# Patient Record
Sex: MALE | Race: WHITE | NOT HISPANIC OR LATINO | Employment: FULL TIME | URBAN - METROPOLITAN AREA
[De-identification: names, ages, dates, MRNs, and addresses within clinical notes are randomized per-mention and may not be internally consistent; named-entity substitution may affect disease eponyms.]

---

## 2017-04-26 ENCOUNTER — HOSPITAL ENCOUNTER (EMERGENCY)
Facility: HOSPITAL | Age: 32
Discharge: HOME | End: 2017-04-26
Attending: EMERGENCY MEDICINE
Payer: COMMERCIAL

## 2017-04-26 VITALS
RESPIRATION RATE: 18 BRPM | HEIGHT: 72 IN | HEART RATE: 63 BPM | OXYGEN SATURATION: 95 % | SYSTOLIC BLOOD PRESSURE: 126 MMHG | BODY MASS INDEX: 32.51 KG/M2 | TEMPERATURE: 98 F | DIASTOLIC BLOOD PRESSURE: 83 MMHG | WEIGHT: 240 LBS

## 2017-04-26 DIAGNOSIS — R10.32 LEFT LOWER QUADRANT PAIN: Primary | ICD-10-CM

## 2017-04-26 DIAGNOSIS — M54.50 BILATERAL LOW BACK PAIN WITHOUT SCIATICA, UNSPECIFIED CHRONICITY: ICD-10-CM

## 2017-04-26 LAB
ALBUMIN SERPL BCP-MCNC: 4.4 G/DL
ALP SERPL-CCNC: 56 U/L
ALT SERPL W/O P-5'-P-CCNC: 22 U/L
ANION GAP SERPL CALC-SCNC: 10 MMOL/L
ANISOCYTOSIS BLD QL SMEAR: SLIGHT
AST SERPL-CCNC: 22 U/L
BASOPHILS # BLD AUTO: 0.02 K/UL
BASOPHILS NFR BLD: 0.2 %
BILIRUB SERPL-MCNC: 0.3 MG/DL
BILIRUB UR QL STRIP: NEGATIVE
BUN SERPL-MCNC: 13 MG/DL
CALCIUM SERPL-MCNC: 9.9 MG/DL
CHLORIDE SERPL-SCNC: 102 MMOL/L
CK SERPL-CCNC: 296 U/L
CLARITY UR: CLEAR
CO2 SERPL-SCNC: 28 MMOL/L
COLOR UR: YELLOW
CREAT SERPL-MCNC: 1 MG/DL
DACRYOCYTES BLD QL SMEAR: NORMAL
DIFFERENTIAL METHOD: NORMAL
EOSINOPHIL # BLD AUTO: 0.2 K/UL
EOSINOPHIL NFR BLD: 1.8 %
ERYTHROCYTE [DISTWIDTH] IN BLOOD BY AUTOMATED COUNT: 12.6 %
EST. GFR  (AFRICAN AMERICAN): >60 ML/MIN/1.73 M^2
EST. GFR  (NON AFRICAN AMERICAN): >60 ML/MIN/1.73 M^2
GLUCOSE SERPL-MCNC: 87 MG/DL
GLUCOSE UR QL STRIP: NEGATIVE
HCT VFR BLD AUTO: 42.7 %
HGB BLD-MCNC: 14.8 G/DL
HGB UR QL STRIP: NEGATIVE
KETONES UR QL STRIP: NEGATIVE
LEUKOCYTE ESTERASE UR QL STRIP: NEGATIVE
LYMPHOCYTES # BLD AUTO: 3.3 K/UL
LYMPHOCYTES NFR BLD: 29.4 %
MCH RBC QN AUTO: 30.2 PG
MCHC RBC AUTO-ENTMCNC: 34.7 %
MCV RBC AUTO: 87 FL
MONOCYTES # BLD AUTO: 1 K/UL
MONOCYTES NFR BLD: 9 %
NEUTROPHILS # BLD AUTO: 6.6 K/UL
NEUTROPHILS NFR BLD: 59.6 %
NITRITE UR QL STRIP: NEGATIVE
OVALOCYTES BLD QL SMEAR: NORMAL
PH UR STRIP: 6 [PH] (ref 5–8)
PLATELET # BLD AUTO: 297 K/UL
PMV BLD AUTO: 9.8 FL
POIKILOCYTOSIS BLD QL SMEAR: SLIGHT
POLYCHROMASIA BLD QL SMEAR: NORMAL
POTASSIUM SERPL-SCNC: 4.1 MMOL/L
PROT SERPL-MCNC: 7.9 G/DL
PROT UR QL STRIP: NEGATIVE
RBC # BLD AUTO: 4.9 M/UL
SODIUM SERPL-SCNC: 140 MMOL/L
SP GR UR STRIP: 1.01 (ref 1–1.03)
URN SPEC COLLECT METH UR: NORMAL
UROBILINOGEN UR STRIP-ACNC: NEGATIVE EU/DL
WBC # BLD AUTO: 11.17 K/UL

## 2017-04-26 PROCEDURE — 25000003 PHARM REV CODE 250: Performed by: NURSE PRACTITIONER

## 2017-04-26 PROCEDURE — 82550 ASSAY OF CK (CPK): CPT

## 2017-04-26 PROCEDURE — 63600175 PHARM REV CODE 636 W HCPCS: Performed by: NURSE PRACTITIONER

## 2017-04-26 PROCEDURE — 96361 HYDRATE IV INFUSION ADD-ON: CPT

## 2017-04-26 PROCEDURE — 85025 COMPLETE CBC W/AUTO DIFF WBC: CPT

## 2017-04-26 PROCEDURE — 80053 COMPREHEN METABOLIC PANEL: CPT

## 2017-04-26 PROCEDURE — 96374 THER/PROPH/DIAG INJ IV PUSH: CPT

## 2017-04-26 PROCEDURE — 81003 URINALYSIS AUTO W/O SCOPE: CPT

## 2017-04-26 PROCEDURE — 99284 EMERGENCY DEPT VISIT MOD MDM: CPT | Mod: 25

## 2017-04-26 RX ORDER — DICYCLOMINE HYDROCHLORIDE 10 MG/1
20 CAPSULE ORAL
Status: COMPLETED | OUTPATIENT
Start: 2017-04-26 | End: 2017-04-26

## 2017-04-26 RX ORDER — NAPROXEN 500 MG/1
500 TABLET ORAL 2 TIMES DAILY PRN
Qty: 10 TABLET | Refills: 0 | Status: SHIPPED | OUTPATIENT
Start: 2017-04-26 | End: 2017-05-01

## 2017-04-26 RX ORDER — KETOROLAC TROMETHAMINE 30 MG/ML
10 INJECTION, SOLUTION INTRAMUSCULAR; INTRAVENOUS
Status: COMPLETED | OUTPATIENT
Start: 2017-04-26 | End: 2017-04-26

## 2017-04-26 RX ORDER — DICYCLOMINE HYDROCHLORIDE 20 MG/1
20 TABLET ORAL 2 TIMES DAILY PRN
Qty: 10 TABLET | Refills: 0 | Status: SHIPPED | OUTPATIENT
Start: 2017-04-26 | End: 2017-05-26

## 2017-04-26 RX ADMIN — SODIUM CHLORIDE 1000 ML: 0.9 INJECTION, SOLUTION INTRAVENOUS at 08:04

## 2017-04-26 RX ADMIN — KETOROLAC TROMETHAMINE 10 MG: 30 INJECTION, SOLUTION INTRAMUSCULAR at 08:04

## 2017-04-26 RX ADMIN — DICYCLOMINE HYDROCHLORIDE 20 MG: 10 CAPSULE ORAL at 08:04

## 2017-04-26 NOTE — ED AVS SNAPSHOT
[facility]  [place] [address]               Heri Gordillo   2017  7:26 PM   ED    Description:  Male : 1985   Department:  [facility]           Your Care was Coordinated By:     Provider Role From To    Sneha Aguilar MD Attending Provider 17 --    Carlos Silva, SHERYL Nurse Practitioner 17 --      Reason for Visit     Back Pain     Abdominal Pain           Diagnoses this Visit        Comments    Left lower quadrant pain    -  Primary     Bilateral low back pain without sciatica, unspecified chronicity           ED Disposition     ED Disposition Condition Comment    Discharge             To Do List           Follow-up Information     Schedule an appointment as soon as possible for a visit with Valentin Chavez MD.    Specialty:  Family Medicine    Why:  This Week, For Follow-Up    Contact information:    [address] [place] [address]  [phone]          Go to [facility].    Specialty:  Emergency Medicine    Why:  If symptoms worsen    Contact information:    [address] [place] [id number] [phone]       These Medications        Disp Refills Start End    naproxen (NAPROSYN) 500 MG tablet 10 tablet 0 2017    Take 1 tablet (500 mg total) by mouth 2 (two) times daily as needed (Pain). Take With Meals - Oral    [place] [facility] [place] [address] Ph #: [phone]       dicyclomine (BENTYL) 20 mg tablet 10 tablet 0 2017    Take 1 tablet (20 mg total) by mouth 2 (two) times daily as needed (Abdominal Pain). - Oral    [place] [facility] [place] [address] Ph #: [phone]         [facility]     [facility] Nurse Care [facility] -  Assistance  Unless otherwise directed by your provider, please contact [facility], our nurse care line  that is available for 24/7 assistance.     Registered nurses in the [facility] provide: appointment scheduling, clinical advisement, health education, and other advisory services.  Call: [phone] (toll free)               Medications           Message regarding Medications     Verify the changes and/or additions to your medication regime listed below are the same as discussed with your clinician today.  If any of these changes or additions are incorrect, please notify your healthcare provider.        START taking these NEW medications        Refills    naproxen (NAPROSYN) 500 MG tablet 0    Sig: Take 1 tablet (500 mg total) by mouth 2 (two) times daily as needed (Pain). Take With Meals    Class: Print    Route: Oral    dicyclomine (BENTYL) 20 mg tablet 0    Sig: Take 1 tablet (20 mg total) by mouth 2 (two) times daily as needed (Abdominal Pain).    Class: Print    Route: Oral      These medications were administered today        Dose Freq    sodium chloride 0.9% bolus 1,000 mL 1,000 mL ED 1 Time    Sig: Inject 1,000 mLs into the vein ED 1 Time.    Class: Normal    Route: Intravenous    dicyclomine capsule 20 mg 20 mg ED 1 Time    Sig: Take 2 capsules (20 mg total) by mouth ED 1 Time.    Class: Normal    Route: Oral    ketorolac injection 10 mg 10 mg ED 1 Time    Sig: Inject 10 mg into the vein ED 1 Time.    Class: Normal    Route: Intravenous           Verify that the below list of medications is an accurate representation of the medications you are currently taking.  If none reported, the list may be blank. If incorrect, please contact your healthcare provider. Carry this list with you in case of emergency.           Current Medications     ESOMEPRAZOLE MAGNESIUM ORAL Take by mouth.    dicyclomine (BENTYL) 20 mg tablet Take 1 tablet (20 mg total) by mouth 2 (two) times daily as needed (Abdominal Pain).    naproxen (NAPROSYN) 500 MG tablet Take 1 tablet (500 mg total) by mouth 2 (two) times daily  as needed (Pain). Take With Meals           Clinical Reference Information           Your Vitals Were     BP Pulse Temp Resp Height Weight    126/83 63 97.5 °F (36.4 °C) 18 6' (1.829 m) 108.9 kg (240 lb)    SpO2 BMI             95% 32.55 kg/m2         Allergies as of 4/26/2017        Reactions    Mucinex [Guaifenesin] Hives    Tylenol [Acetaminophen] Hives      Immunizations Administered on Date of Encounter - 4/26/2017     None      ED Micro, Lab, POCT     Start Ordered       Status Ordering Provider    04/26/17 2003 04/26/17 2003  Urinalysis Clean Catch  STAT      Final result     04/26/17 2002 04/26/17 2003  CBC auto differential  STAT      Final result     04/26/17 2002 04/26/17 2003  Comprehensive metabolic panel  STAT      Final result     04/26/17 2002 04/26/17 2003  CPK  STAT      Final result       ED Imaging Orders     Start Ordered       Status Ordering Provider    04/26/17 2004 04/26/17 2003  X-Ray Abdomen Flat And Erect  1 time imaging      Final result         Discharge Instructions       Please return to the Emergency Department for any new or worsening symptoms including: worsening back pain or abdominal pain, changes in the location of your back or abdominal pain, fever, chest pain, shortness of breath, loss of consciousness, dizziness, weakness, or any other concerns.     Please follow up with your Primary Care Provider within in the week. If you do not have a Primary Care Provider, you may contact the one listed on your discharge paperwork or you may also call the [facility]oint[facility]k at [phone] to schedule an appointment with a Primary Care Provider.     Please take all medication as prescribed.     You have been prescribed Naproxen for pain. This is an Non-Steroidal Anti-Inflammatory (NSAID) Medication. Please do not take any additional NSAIDs while you are taking this medication including (Advil, Aleve, Motrin, Ibuprofen, Mobic\meloxicam, Naprosyn, etc.). Please stop taking  this medication if you experience: weakness, itching, yellow skin or eyes, joint pains, vomiting blood, blood or black stools, unusual weight gain, or swelling in your arms, legs, hands, or feet.     Emergency Department Survey:  Our goal in the emergency department is to always give you outstanding care and exceptional service. You may receive a survey by mail or e-mail in the next week regarding your experience in our ED. We would greatly appreciate your completing and returning the survey. Your feedback provides us with a way to recognize our staff who give very good care and it helps us learn how to improve when your experience was below our aspiration of excellence.       Discharge References/Attachments     ABDOMINAL PAIN, UNKNOWN CAUSE, (MALE) (ENGLISH)    BACK PAIN (ACUTE OR CHRONIC) (ENGLISH)      [facility] Sign-Up     Activating your [facility] account is as easy as 1-2-3!     1) Visit [email or web address], select Sign Up Now, enter this activation code and your date of birth, then select Next.  [email or web address]  Expires: 6/10/2017 10:03 PM      2) Create a username and password to use when you visit [facility] in the future and select a security question in case you lose your password and select Next.    3) Enter your e-mail address and click Sign Up!    Additional Information  If you have questions, please e-mail [email or web address] or call [phone] to talk to our [facility] staff. Remember, [facility] is NOT to be used for urgent needs. For medical emergencies, dial 911.          [facility] complies with applicable Federal civil rights laws and does not discriminate on the basis of race, color, national origin, age, disability, or sex.        Language Assistance Services     ATTENTION: Language assistance services are available, free of charge. Please call [phone].      ATENCIÓN: Si habla español, tiene a cruz disposición servicios gratuitos de asistencia lingüística. Llame al  [phone].     LIUDMILA Ý: N?u b?n nói Ti?ng Vi?t, có các d?ch v? h? tr? ngôn ng? mi?n phí dành cho b?n. G?i s? [phone].

## 2017-04-27 NOTE — ED PROVIDER NOTES
"Encounter Date: 4/26/2017    SCRIBE #1 NOTE: I, Brady Pena , am scribing for, and in the presence of,  SHERYL Almanza . I have scribed the following portions of the note - Other sections scribed: HPI, ROS .       History     Chief Complaint   Patient presents with    Back Pain     lower back pain x 2weeks and abdmonial pain x 2weeks. denies n/v    Abdominal Pain     Review of patient's allergies indicates:   Allergen Reactions    Mucinex [guaifenesin] Hives    Tylenol [acetaminophen] Hives     HPI Comments: CC: Abdominal Pain; Back Pain     HPI: This 31 y.o. male with a medical history of Neck injury and Tailbone injury presents to the ED accompanied by spouse c/o bilateral lower back pain and "sharp" lower left quadrant abdominal pain that began 2 weeks ago. Standing up exacerbates the back pain. Food/drinking exacerbates the abdominal pain. Pt also reports "slow" difficult urination that began earlier today. Symptoms are acute in onset, constant, and severe 10/10. Pt saw a gastroenterologist yesterday and was given no treatment plan. Pt attempted treatment with Prilosec daily for the last 5 days, with some relief. Pt is a  and denies any unusual heavy lifting. Pt denies history of back pain. Pt denies any medical complications. Pt denies blood in stool, dysuria, hematuria, or any other associated symptoms. Pt has no alleviating factors.     The history is provided by the patient. No  was used.     Past Medical History:   Diagnosis Date    Neck injury     Tailbone injury      Past Surgical History:   Procedure Laterality Date    SPINE SURGERY       History reviewed. No pertinent family history.  Social History   Substance Use Topics    Smoking status: Never Smoker    Smokeless tobacco: None    Alcohol use Yes     Review of Systems   Constitutional: Negative for fever.   HENT: Negative for ear pain and sore throat.    Respiratory: Negative for shortness of " breath.    Cardiovascular: Negative for chest pain.   Gastrointestinal: Positive for abdominal pain (LLQ). Negative for blood in stool, nausea and vomiting.   Genitourinary: Positive for difficulty urinating (intermittent). Negative for dysuria, flank pain, frequency, hematuria, penile pain, penile swelling, scrotal swelling and testicular pain.   Musculoskeletal: Positive for back pain (bilateral lower region). Negative for neck pain.   Skin: Negative for rash and wound.   Neurological: Negative for syncope and weakness.   Hematological: Does not bruise/bleed easily.   Psychiatric/Behavioral: Negative for confusion.       Physical Exam   Initial Vitals   BP Pulse Resp Temp SpO2   04/26/17 1918 04/26/17 1918 04/26/17 1918 04/26/17 1918 04/26/17 1918   137/85 83 19 97.7 °F (36.5 °C) 96 %     Physical Exam    Nursing note and vitals reviewed.  Constitutional: Vital signs are normal. He appears well-developed and well-nourished. He is not diaphoretic. He is cooperative.  Non-toxic appearance. He does not have a sickly appearance. He does not appear ill. No distress.   HENT:   Head: Normocephalic and atraumatic.   Right Ear: Tympanic membrane and external ear normal.   Left Ear: Tympanic membrane and external ear normal.   Nose: No rhinorrhea.   Mouth/Throat: Uvula is midline and oropharynx is clear and moist.   Eyes: Conjunctivae and EOM are normal.   Neck: Normal range of motion and full passive range of motion without pain. No spinous process tenderness and no muscular tenderness present. Normal range of motion present. No rigidity.   Cardiovascular: Normal rate, regular rhythm, normal heart sounds and intact distal pulses. Exam reveals no gallop and no friction rub.    No murmur heard.  Pulses:       Radial pulses are 2+ on the right side, and 2+ on the left side.        Posterior tibial pulses are 2+ on the right side, and 2+ on the left side.   Pulmonary/Chest: Breath sounds normal. No tachypnea and no bradypnea.  No respiratory distress. He has no wheezes. He has no rhonchi. He has no rales. He exhibits no tenderness.   Abdominal: Soft. Normal appearance and bowel sounds are normal. He exhibits no distension, no abdominal bruit and no mass. There is tenderness (mild) in the left lower quadrant. There is no rigidity, no rebound, no guarding, no CVA tenderness, no tenderness at McBurney's point and negative Watson's sign. No hernia. Hernia confirmed negative in the ventral area, confirmed negative in the right inguinal area and confirmed negative in the left inguinal area.   Genitourinary: Testes normal. Right testis shows no mass, no swelling and no tenderness. Left testis shows no mass, no swelling and no tenderness. No penile erythema or penile tenderness. No discharge found.   Musculoskeletal: Normal range of motion.        Cervical back: Normal.        Thoracic back: Normal.        Lumbar back: He exhibits tenderness and pain. He exhibits no bony tenderness.        Back:    Palpation of bilateral lower lumbar muscular back.  There is no midline cervical, thoracic, lumbar tenderness palpation.  There is no bruising, erythema, edema, or increased warmth noted.    Neurological: He is alert and oriented to person, place, and time. He has normal strength. No sensory deficit. Coordination and gait normal. GCS eye subscore is 4. GCS verbal subscore is 5. GCS motor subscore is 6.   Skin: Skin is warm and dry. No rash noted.   Psychiatric: He has a normal mood and affect. His behavior is normal. Judgment and thought content normal.         ED Course   Procedures  Labs Reviewed   CK - Abnormal; Notable for the following:        Result Value     (*)     All other components within normal limits   CBC W/ AUTO DIFFERENTIAL   COMPREHENSIVE METABOLIC PANEL   URINALYSIS             Medical Decision Making:   Clinical Tests:   Lab Tests: Ordered and Reviewed  Radiological Study: Ordered and Reviewed       APC / Resident Notes:    This is an evaluation of a 31 y.o. male that presents to the Emergency Department for a 2 week history of constant bilateral lower lumbar back pain and a 2 week history of constant left lower abdominal pain.  He denies any injuries.  He does work construction.  He saw GI yesterday because he thought the pain could be related to his stomach.  GI the rectal exam and noted a slightly enlarged prostate otherwise unremarkable exam. Physical Exam shows a non-toxic, afebrile, and well appearing male.  Generalized muscular tenderness to lower lumbar back.  There is no midline lumbar tenderness.  Bony abnormalities palpated on the cervical, thoracic, lumbar spines.  Ears and throat without infection.  Breath sounds clear and equal auscultation.  Heart regular rhythm with normal rate.  His abdomen is soft with mild tenderness in left lower quadrant with rebound, guarding, or masses.  Bowel sounds regular.   exam with no hernia or testicular pain.  Equal sensation to upper and lower extremities bilaterally.  No saddle anesthesia.  Vital Signs Are Reassuring. RESULTS: Urine negative for infection.  Unremarkable CMP and CMP.  CPK mildly elevated at 296.  X-ray flat and erect of the abdomen with no bowel distention, gas, or mass effect noted.  Osseous structures intact.    My overall impression is back pain, likely musculoskeletal and left lower quadrant abdominal pain. I considered, but at this time, do not suspect pyelonephritis, UTI, bowel obstruction, bowel perforation, appendicitis, rhabdomyolysis, vertebral fracture, spinal epidural abscess, cauda equina, shingles, diverticulitis, hernia, kidney stone.     ED Course: Toradol and Bentyl.  Reassessment: Patient reports his abdominal pain has improved.  Denies any nausea or vomiting at this time.  D/C Meds: Bentyl and naproxen. The diagnosis, treatment plan, instructions for follow-up and reevaluation with his PCP as well as ED return precautions were discussed and  understanding was verbalized. All questions or concerns have been addressed. This case was discussed with Dr. Aguilar who is in agreement with my assessment and plan. SONJA Franco, FELA        Scribe Attestation:   Scribe #1: I performed the above scribed service and the documentation accurately describes the services I performed. I attest to the accuracy of the note.    Attending Attestation:           Physician Attestation for Scribe:  Physician Attestation Statement for Scribe #1: I, SHERYL Almanza , reviewed documentation, as scribed by Brady Pena  in my presence, and it is both accurate and complete.                 ED Course     Clinical Impression:   The primary encounter diagnosis was Left lower quadrant pain. A diagnosis of Bilateral low back pain without sciatica, unspecified chronicity was also pertinent to this visit.    Disposition:   Disposition: Discharged  Condition: Stable       SHERYL Almanza  04/26/17 2150

## 2017-04-27 NOTE — DISCHARGE INSTRUCTIONS
Please return to the Emergency Department for any new or worsening symptoms including: worsening back pain or abdominal pain, changes in the location of your back or abdominal pain, fever, chest pain, shortness of breath, loss of consciousness, dizziness, weakness, or any other concerns.     Please follow up with your Primary Care Provider within in the week. If you do not have a Primary Care Provider, you may contact the one listed on your discharge paperwork or you may also call the [facility] Appointment [facility]k at [phone] to schedule an appointment with a Primary Care Provider.     Please take all medication as prescribed.     You have been prescribed Naproxen for pain. This is an Non-Steroidal Anti-Inflammatory (NSAID) Medication. Please do not take any additional NSAIDs while you are taking this medication including (Advil, Aleve, Motrin, Ibuprofen, Mobic\meloxicam, Naprosyn, etc.). Please stop taking this medication if you experience: weakness, itching, yellow skin or eyes, joint pains, vomiting blood, blood or black stools, unusual weight gain, or swelling in your arms, legs, hands, or feet.     Emergency Department Survey:  Our goal in the emergency department is to always give you outstanding care and exceptional service. You may receive a survey by mail or e-mail in the next week regarding your experience in our ED. We would greatly appreciate your completing and returning the survey. Your feedback provides us with a way to recognize our staff who give very good care and it helps us learn how to improve when your experience was below our aspiration of excellence.

## 2017-04-27 NOTE — ED TRIAGE NOTES
Patient states he had lower back pain and left lower abdominal pain for 2 weeks. Patient went to see his GI yesterday and was told he had a swollen prostate.

## 2018-04-07 ENCOUNTER — HOSPITAL ENCOUNTER (EMERGENCY)
Facility: HOSPITAL | Age: 33
Discharge: ANOTHER HOSPITAL | End: 2018-04-07
Attending: EMERGENCY MEDICINE | Admitting: OTOLARYNGOLOGY
Payer: COMMERCIAL

## 2018-04-07 VITALS
OXYGEN SATURATION: 95 % | BODY MASS INDEX: 34.54 KG/M2 | HEIGHT: 72 IN | TEMPERATURE: 98 F | HEART RATE: 89 BPM | DIASTOLIC BLOOD PRESSURE: 79 MMHG | WEIGHT: 255 LBS | RESPIRATION RATE: 20 BRPM | SYSTOLIC BLOOD PRESSURE: 132 MMHG

## 2018-04-07 DIAGNOSIS — S02.92XA CLOSED EXTENSIVE FACIAL FRACTURES, INITIAL ENCOUNTER: ICD-10-CM

## 2018-04-07 DIAGNOSIS — H20.9 TRAUMATIC IRITIS: Primary | ICD-10-CM

## 2018-04-07 PROCEDURE — 12013 RPR F/E/E/N/L/M 2.6-5.0 CM: CPT

## 2018-04-07 PROCEDURE — 63600175 PHARM REV CODE 636 W HCPCS: Performed by: EMERGENCY MEDICINE

## 2018-04-07 PROCEDURE — 96374 THER/PROPH/DIAG INJ IV PUSH: CPT

## 2018-04-07 PROCEDURE — 99285 EMERGENCY DEPT VISIT HI MDM: CPT | Mod: 25

## 2018-04-07 PROCEDURE — 96376 TX/PRO/DX INJ SAME DRUG ADON: CPT

## 2018-04-07 PROCEDURE — 96375 TX/PRO/DX INJ NEW DRUG ADDON: CPT

## 2018-04-07 PROCEDURE — 99285 EMERGENCY DEPT VISIT HI MDM: CPT | Mod: ,,, | Performed by: EMERGENCY MEDICINE

## 2018-04-07 RX ORDER — MORPHINE SULFATE 4 MG/ML
4 INJECTION, SOLUTION INTRAMUSCULAR; INTRAVENOUS
Status: COMPLETED | OUTPATIENT
Start: 2018-04-07 | End: 2018-04-07

## 2018-04-07 RX ORDER — ATROPINE SULFATE 10 MG/ML
2 SOLUTION/ DROPS OPHTHALMIC 2 TIMES DAILY
Qty: 5 BOTTLE | Refills: 0 | Status: SHIPPED | OUTPATIENT
Start: 2018-04-07

## 2018-04-07 RX ORDER — ONDANSETRON 2 MG/ML
4 INJECTION INTRAMUSCULAR; INTRAVENOUS
Status: COMPLETED | OUTPATIENT
Start: 2018-04-07 | End: 2018-04-07

## 2018-04-07 RX ORDER — HYDROCODONE BITARTRATE AND ACETAMINOPHEN 5; 325 MG/1; MG/1
1 TABLET ORAL EVERY 6 HOURS PRN
Qty: 20 TABLET | Refills: 0 | Status: SHIPPED | OUTPATIENT
Start: 2018-04-07

## 2018-04-07 RX ORDER — PREDNISOLONE ACETATE 10 MG/ML
1 SUSPENSION/ DROPS OPHTHALMIC 4 TIMES DAILY
Qty: 5 ML | Refills: 0 | Status: SHIPPED | OUTPATIENT
Start: 2018-04-07 | End: 2018-04-17

## 2018-04-07 RX ORDER — PREDNISOLONE ACETATE 10 MG/ML
1 SUSPENSION/ DROPS OPHTHALMIC 4 TIMES DAILY
Qty: 5 ML | Refills: 0 | Status: SHIPPED | OUTPATIENT
Start: 2018-04-07 | End: 2018-04-07

## 2018-04-07 RX ORDER — AMOXICILLIN AND CLAVULANATE POTASSIUM 875; 125 MG/1; MG/1
1 TABLET, FILM COATED ORAL 2 TIMES DAILY
Qty: 14 TABLET | Refills: 0 | Status: ON HOLD | OUTPATIENT
Start: 2018-04-07 | End: 2018-04-21

## 2018-04-07 RX ADMIN — ONDANSETRON 4 MG: 2 INJECTION INTRAMUSCULAR; INTRAVENOUS at 11:04

## 2018-04-07 RX ADMIN — MORPHINE SULFATE 4 MG: 4 INJECTION INTRAVENOUS at 11:04

## 2018-04-07 RX ADMIN — ONDANSETRON 4 MG: 2 INJECTION INTRAMUSCULAR; INTRAVENOUS at 06:04

## 2018-04-07 RX ADMIN — MORPHINE SULFATE 4 MG: 4 INJECTION INTRAVENOUS at 06:04

## 2018-04-07 NOTE — ED NOTES
Appearance:  Pt awake, alert & oriented to person, place & time.    Skin:  Skin warm, dry & intact.  Mucous membranes moist.  Skin turgor normal.  Respiratory:  Respirations even, non-labored.    Neurologic:  Pt moving all extremities without difficulty.  Sensation intact.     Peripheral Vascular:  All peripheral pulses present.  ENT:  Bilateral eye ecchymosis and swelling to upper & lower eyelids.  Subconjunctival hemorrhage noted to both eyes.  Slight oozing of blood from nose.
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
## 2018-04-07 NOTE — DISCHARGE INSTRUCTIONS
Head of bed elevation  Follow up with Dr. Rangel on Tues  Follow up with sujatha  Take antibiotics as prescribed  Strict liquid diet
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
## 2018-04-07 NOTE — ED NOTES
Received report from Adri Rn; pt assessed; vitals assessed; pt in NAD at this time; call light in reach; side rails raised times 2

## 2018-04-09 ENCOUNTER — TELEPHONE (OUTPATIENT)
Dept: OTOLARYNGOLOGY | Facility: CLINIC | Age: 33
End: 2018-04-09

## 2018-04-09 NOTE — TELEPHONE ENCOUNTER
----- Message from Leandra Aguilar sent at 4/9/2018  9:49 AM CDT -----  Contact: pts wife at [phone]  Rangel pt- Juan Carlos saw this pt in the ER Saturday April 6 and was told to come in Tuesday.  Needs to be scheduled for nasal surgery and something with his pallet. Please call Mrs. Gordillo at above number.  Will need asap.

## 2018-04-11 ENCOUNTER — OFFICE VISIT (OUTPATIENT)
Dept: OTOLARYNGOLOGY | Facility: CLINIC | Age: 33
End: 2018-04-11
Payer: COMMERCIAL

## 2018-04-11 VITALS
HEART RATE: 96 BPM | BODY MASS INDEX: 35.15 KG/M2 | DIASTOLIC BLOOD PRESSURE: 82 MMHG | HEIGHT: 72 IN | TEMPERATURE: 98 F | WEIGHT: 259.5 LBS | SYSTOLIC BLOOD PRESSURE: 129 MMHG

## 2018-04-11 DIAGNOSIS — M26.4 MALOCCLUSION: ICD-10-CM

## 2018-04-11 DIAGNOSIS — S02.413B: Primary | ICD-10-CM

## 2018-04-11 DIAGNOSIS — S02.411A CLOSED LE FORT I FRACTURE, INITIAL ENCOUNTER: ICD-10-CM

## 2018-04-11 DIAGNOSIS — S02.412B: ICD-10-CM

## 2018-04-11 DIAGNOSIS — S02.30XB: ICD-10-CM

## 2018-04-11 PROCEDURE — 99999 PR PBB SHADOW E&M-EST. PATIENT-LVL III: CPT | Mod: PBBFAC,,, | Performed by: OTOLARYNGOLOGY

## 2018-04-11 PROCEDURE — 99213 OFFICE O/P EST LOW 20 MIN: CPT | Mod: PBBFAC | Performed by: OTOLARYNGOLOGY

## 2018-04-11 PROCEDURE — 99204 OFFICE O/P NEW MOD 45 MIN: CPT | Mod: S$GLB,,, | Performed by: OTOLARYNGOLOGY

## 2018-04-11 RX ORDER — IBUPROFEN 600 MG/1
600 TABLET ORAL 3 TIMES DAILY
COMMUNITY

## 2018-04-11 NOTE — LETTER
April 11, 2018      Jose Alejandro Britton MD  [address]  [facility] [address]           [facility]  [address]  [facility] [address]  Phone: [phone]  Fax: [phone]          Patient: Heri Gordillo   MR Number: [id number]   YOB: 1985   Date of Visit: 4/11/2018       Dear Dr. Jose Alejandro Britton:    Thank you for referring Heri Gordillo to me for evaluation. Attached you will find relevant portions of my assessment and plan of care.    If you have questions, please do not hesitate to call me. I look forward to following Heri Gordillo along with you.    Sincerely,    Pritesh Rangel MD    Enclosure  CC:  No Recipients    If you would like to receive this communication electronically, please contact [email or web address] or [phone] to request more information on [employer] Link access.    For providers and/or their staff who would like to refer a patient to [facility], please contact us through our one-stop-shop provider referral line, [facility], at [phone].    If you feel you have received this communication in error or would no longer like to receive these types of communications, please e-mail [email or web address]
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
## 2018-04-16 ENCOUNTER — TELEPHONE (OUTPATIENT)
Dept: OPHTHALMOLOGY | Facility: CLINIC | Age: 33
End: 2018-04-16

## 2018-04-18 ENCOUNTER — TELEPHONE (OUTPATIENT)
Dept: OTOLARYNGOLOGY | Facility: CLINIC | Age: 33
End: 2018-04-18

## 2018-04-19 ENCOUNTER — SURGERY (OUTPATIENT)
Age: 33
End: 2018-04-19

## 2018-04-19 ENCOUNTER — HOSPITAL ENCOUNTER (OUTPATIENT)
Facility: HOSPITAL | Age: 33
LOS: 1 days | Discharge: HOME | End: 2018-04-21
Attending: OTOLARYNGOLOGY | Admitting: OTOLARYNGOLOGY
Payer: COMMERCIAL

## 2018-04-19 ENCOUNTER — ANESTHESIA EVENT (OUTPATIENT)
Dept: SURGERY | Facility: HOSPITAL | Age: 33
End: 2018-04-19
Payer: COMMERCIAL

## 2018-04-19 ENCOUNTER — ANESTHESIA (OUTPATIENT)
Dept: SURGERY | Facility: HOSPITAL | Age: 33
End: 2018-04-19
Payer: COMMERCIAL

## 2018-04-19 DIAGNOSIS — S02.92XA: Primary | ICD-10-CM

## 2018-04-19 PROCEDURE — 27100025 HC TUBING, SET FLUID WARMER: Performed by: NURSE ANESTHETIST, CERTIFIED REGISTERED

## 2018-04-19 PROCEDURE — 27200708 HC INTUBATION/EXCHANGE WAND: Performed by: NURSE ANESTHETIST, CERTIFIED REGISTERED

## 2018-04-19 PROCEDURE — 27201423 OPTIME MED/SURG SUP & DEVICES STERILE SUPPLY: Performed by: OTOLARYNGOLOGY

## 2018-04-19 PROCEDURE — 25000003 PHARM REV CODE 250: Performed by: OTOLARYNGOLOGY

## 2018-04-19 PROCEDURE — D9220A PRA ANESTHESIA: Mod: ANES,,, | Performed by: ANESTHESIOLOGY

## 2018-04-19 PROCEDURE — 63600175 PHARM REV CODE 636 W HCPCS: Performed by: ANESTHESIOLOGY

## 2018-04-19 PROCEDURE — 25000003 PHARM REV CODE 250: Performed by: NURSE ANESTHETIST, CERTIFIED REGISTERED

## 2018-04-19 PROCEDURE — 36000711: Performed by: OTOLARYNGOLOGY

## 2018-04-19 PROCEDURE — 63600175 PHARM REV CODE 636 W HCPCS: Performed by: NURSE ANESTHETIST, CERTIFIED REGISTERED

## 2018-04-19 PROCEDURE — C1713 ANCHOR/SCREW BN/BN,TIS/BN: HCPCS | Performed by: OTOLARYNGOLOGY

## 2018-04-19 PROCEDURE — 21340 PERQ TX NASOETHMOID FX: CPT | Mod: 51,,, | Performed by: OTOLARYNGOLOGY

## 2018-04-19 PROCEDURE — 71000039 HC RECOVERY, EACH ADD'L HOUR: Performed by: OTOLARYNGOLOGY

## 2018-04-19 PROCEDURE — 21435 OPTX CRNFC SEP COMP INT&/XTR: CPT | Mod: ,,, | Performed by: OTOLARYNGOLOGY

## 2018-04-19 PROCEDURE — 21390 OPN TX ORBIT PERIORBTL IMPLT: CPT | Mod: 50,51,, | Performed by: OTOLARYNGOLOGY

## 2018-04-19 PROCEDURE — D9220A PRA ANESTHESIA: Mod: CRNA,,, | Performed by: NURSE ANESTHETIST, CERTIFIED REGISTERED

## 2018-04-19 PROCEDURE — 21423 OPTX PALATAL/MAX FX COMP: CPT | Mod: 51,,, | Performed by: OTOLARYNGOLOGY

## 2018-04-19 PROCEDURE — 11012 DEB SKIN BONE AT FX SITE: CPT | Mod: 51,,, | Performed by: OTOLARYNGOLOGY

## 2018-04-19 PROCEDURE — 37000008 HC ANESTHESIA 1ST 15 MINUTES: Performed by: OTOLARYNGOLOGY

## 2018-04-19 PROCEDURE — 37000009 HC ANESTHESIA EA ADD 15 MINS: Performed by: OTOLARYNGOLOGY

## 2018-04-19 PROCEDURE — 27000221 HC OXYGEN, UP TO 24 HOURS

## 2018-04-19 PROCEDURE — 63600175 PHARM REV CODE 636 W HCPCS

## 2018-04-19 PROCEDURE — 94761 N-INVAS EAR/PLS OXIMETRY MLT: CPT

## 2018-04-19 PROCEDURE — 71000033 HC RECOVERY, INTIAL HOUR: Performed by: OTOLARYNGOLOGY

## 2018-04-19 PROCEDURE — G0378 HOSPITAL OBSERVATION PER HR: HCPCS

## 2018-04-19 PROCEDURE — 36000710: Performed by: OTOLARYNGOLOGY

## 2018-04-19 PROCEDURE — 21320 CLSD TX NSL FX W/MNPJ&STABLJ: CPT | Mod: 51,,, | Performed by: OTOLARYNGOLOGY

## 2018-04-19 PROCEDURE — 25000003 PHARM REV CODE 250

## 2018-04-19 PROCEDURE — 30930 THER FX NASAL INF TURBINATE: CPT | Mod: 50,51,, | Performed by: OTOLARYNGOLOGY

## 2018-04-19 PROCEDURE — A4216 STERILE WATER/SALINE, 10 ML: HCPCS | Performed by: NURSE ANESTHETIST, CERTIFIED REGISTERED

## 2018-04-19 DEVICE — IMPLANTABLE DEVICE: Type: IMPLANTABLE DEVICE | Site: ORBITAL FLOOR | Status: FUNCTIONAL

## 2018-04-19 RX ORDER — ONDANSETRON 2 MG/ML
INJECTION INTRAMUSCULAR; INTRAVENOUS
Status: DISCONTINUED | OUTPATIENT
Start: 2018-04-19 | End: 2018-04-19

## 2018-04-19 RX ORDER — GLYCOPYRROLATE 0.2 MG/ML
INJECTION INTRAMUSCULAR; INTRAVENOUS
Status: DISCONTINUED | OUTPATIENT
Start: 2018-04-19 | End: 2018-04-19

## 2018-04-19 RX ORDER — TOBRAMYCIN AND DEXAMETHASONE 3; 1 MG/ML; MG/ML
2 SUSPENSION/ DROPS OPHTHALMIC
Status: DISCONTINUED | OUTPATIENT
Start: 2018-04-19 | End: 2018-04-21 | Stop reason: HOSPADM

## 2018-04-19 RX ORDER — MORPHINE SULFATE 2 MG/ML
4 INJECTION, SOLUTION INTRAMUSCULAR; INTRAVENOUS EVERY 4 HOURS PRN
Status: DISCONTINUED | OUTPATIENT
Start: 2018-04-19 | End: 2018-04-21 | Stop reason: HOSPADM

## 2018-04-19 RX ORDER — LIDOCAINE HYDROCHLORIDE 40 MG/ML
SOLUTION TOPICAL
Status: DISCONTINUED | OUTPATIENT
Start: 2018-04-19 | End: 2018-04-19

## 2018-04-19 RX ORDER — ONDANSETRON 8 MG/1
8 TABLET, ORALLY DISINTEGRATING ORAL EVERY 8 HOURS PRN
Status: DISCONTINUED | OUTPATIENT
Start: 2018-04-19 | End: 2018-04-21 | Stop reason: HOSPADM

## 2018-04-19 RX ORDER — MIDAZOLAM HYDROCHLORIDE 1 MG/ML
INJECTION, SOLUTION INTRAMUSCULAR; INTRAVENOUS
Status: DISCONTINUED | OUTPATIENT
Start: 2018-04-19 | End: 2018-04-19

## 2018-04-19 RX ORDER — SODIUM CHLORIDE 0.9 % (FLUSH) 0.9 %
3 SYRINGE (ML) INJECTION
Status: DISCONTINUED | OUTPATIENT
Start: 2018-04-19 | End: 2018-04-21 | Stop reason: HOSPADM

## 2018-04-19 RX ORDER — ATROPINE SULFATE 10 MG/ML
2 SOLUTION/ DROPS OPHTHALMIC 2 TIMES DAILY
Status: DISCONTINUED | OUTPATIENT
Start: 2018-04-19 | End: 2018-04-19

## 2018-04-19 RX ORDER — DEXTROSE MONOHYDRATE, SODIUM CHLORIDE, AND POTASSIUM CHLORIDE 50; 1.49; 9 G/1000ML; G/1000ML; G/1000ML
INJECTION, SOLUTION INTRAVENOUS CONTINUOUS
Status: DISCONTINUED | OUTPATIENT
Start: 2018-04-19 | End: 2018-04-21

## 2018-04-19 RX ORDER — DEXAMETHASONE SODIUM PHOSPHATE 4 MG/ML
INJECTION, SOLUTION INTRA-ARTICULAR; INTRALESIONAL; INTRAMUSCULAR; INTRAVENOUS; SOFT TISSUE
Status: DISCONTINUED | OUTPATIENT
Start: 2018-04-19 | End: 2018-04-19

## 2018-04-19 RX ORDER — DEXMEDETOMIDINE HYDROCHLORIDE 100 UG/ML
INJECTION, SOLUTION INTRAVENOUS
Status: DISCONTINUED | OUTPATIENT
Start: 2018-04-19 | End: 2018-04-19

## 2018-04-19 RX ORDER — SODIUM CHLORIDE 9 MG/ML
INJECTION, SOLUTION INTRAVENOUS CONTINUOUS
Status: DISCONTINUED | OUTPATIENT
Start: 2018-04-19 | End: 2018-04-19

## 2018-04-19 RX ORDER — OXYMETAZOLINE HCL 0.05 %
SPRAY, NON-AEROSOL (ML) NASAL
Status: DISCONTINUED | OUTPATIENT
Start: 2018-04-19 | End: 2018-04-19

## 2018-04-19 RX ORDER — OXYCODONE HYDROCHLORIDE 5 MG/1
10 TABLET ORAL EVERY 4 HOURS PRN
Status: DISCONTINUED | OUTPATIENT
Start: 2018-04-19 | End: 2018-04-20

## 2018-04-19 RX ORDER — SODIUM CHLORIDE 0.9 % (FLUSH) 0.9 %
3 SYRINGE (ML) INJECTION
Status: DISCONTINUED | OUTPATIENT
Start: 2018-04-19 | End: 2018-04-19 | Stop reason: HOSPADM

## 2018-04-19 RX ORDER — KETAMINE HYDROCHLORIDE 100 MG/ML
INJECTION, SOLUTION INTRAMUSCULAR; INTRAVENOUS
Status: DISCONTINUED | OUTPATIENT
Start: 2018-04-19 | End: 2018-04-19

## 2018-04-19 RX ORDER — DIPHENHYDRAMINE HYDROCHLORIDE 50 MG/ML
25 INJECTION INTRAMUSCULAR; INTRAVENOUS EVERY 4 HOURS PRN
Status: DISCONTINUED | OUTPATIENT
Start: 2018-04-19 | End: 2018-04-20

## 2018-04-19 RX ORDER — RAMELTEON 8 MG/1
8 TABLET ORAL NIGHTLY PRN
Status: DISCONTINUED | OUTPATIENT
Start: 2018-04-19 | End: 2018-04-21 | Stop reason: HOSPADM

## 2018-04-19 RX ORDER — BACITRACIN 500 [USP'U]/G
OINTMENT TOPICAL 3 TIMES DAILY
Status: DISCONTINUED | OUTPATIENT
Start: 2018-04-19 | End: 2018-04-21 | Stop reason: HOSPADM

## 2018-04-19 RX ORDER — BACITRACIN 50000 [IU]/1
INJECTION, POWDER, FOR SOLUTION INTRAMUSCULAR
Status: DISCONTINUED | OUTPATIENT
Start: 2018-04-19 | End: 2018-04-19

## 2018-04-19 RX ORDER — LIDOCAINE HYDROCHLORIDE 20 MG/ML
SOLUTION OROPHARYNGEAL
Status: DISCONTINUED | OUTPATIENT
Start: 2018-04-19 | End: 2018-04-19

## 2018-04-19 RX ORDER — FENTANYL CITRATE 50 UG/ML
INJECTION, SOLUTION INTRAMUSCULAR; INTRAVENOUS
Status: COMPLETED
Start: 2018-04-19 | End: 2018-04-19

## 2018-04-19 RX ORDER — CEFAZOLIN SODIUM 1 G/3ML
2 INJECTION, POWDER, FOR SOLUTION INTRAMUSCULAR; INTRAVENOUS ONCE
Status: COMPLETED | OUTPATIENT
Start: 2018-04-19 | End: 2018-04-19

## 2018-04-19 RX ORDER — LIDOCAINE HYDROCHLORIDE AND EPINEPHRINE 10; 10 MG/ML; UG/ML
INJECTION, SOLUTION INFILTRATION; PERINEURAL
Status: DISCONTINUED | OUTPATIENT
Start: 2018-04-19 | End: 2018-04-19

## 2018-04-19 RX ORDER — LIDOCAINE HYDROCHLORIDE 10 MG/ML
1 INJECTION, SOLUTION EPIDURAL; INFILTRATION; INTRACAUDAL; PERINEURAL ONCE
Status: COMPLETED | OUTPATIENT
Start: 2018-04-19 | End: 2018-04-19

## 2018-04-19 RX ORDER — ATROPINE SULFATE 10 MG/ML
2 SOLUTION/ DROPS OPHTHALMIC 2 TIMES DAILY
Status: DISCONTINUED | OUTPATIENT
Start: 2018-04-19 | End: 2018-04-21 | Stop reason: HOSPADM

## 2018-04-19 RX ORDER — FENTANYL CITRATE 50 UG/ML
INJECTION, SOLUTION INTRAMUSCULAR; INTRAVENOUS
Status: DISCONTINUED | OUTPATIENT
Start: 2018-04-19 | End: 2018-04-19

## 2018-04-19 RX ORDER — PROPOFOL 10 MG/ML
VIAL (ML) INTRAVENOUS
Status: DISCONTINUED | OUTPATIENT
Start: 2018-04-19 | End: 2018-04-19

## 2018-04-19 RX ORDER — ROCURONIUM BROMIDE 10 MG/ML
INJECTION, SOLUTION INTRAVENOUS
Status: DISCONTINUED | OUTPATIENT
Start: 2018-04-19 | End: 2018-04-19

## 2018-04-19 RX ORDER — FENTANYL CITRATE 50 UG/ML
25 INJECTION, SOLUTION INTRAMUSCULAR; INTRAVENOUS EVERY 5 MIN PRN
Status: COMPLETED | OUTPATIENT
Start: 2018-04-19 | End: 2018-04-19

## 2018-04-19 RX ORDER — PANTOPRAZOLE SODIUM 40 MG/1
40 TABLET, DELAYED RELEASE ORAL DAILY
Status: DISCONTINUED | OUTPATIENT
Start: 2018-04-20 | End: 2018-04-21 | Stop reason: HOSPADM

## 2018-04-19 RX ORDER — OXYMETAZOLINE HCL 0.05 %
2 SPRAY, NON-AEROSOL (ML) NASAL EVERY 6 HOURS PRN
Status: DISCONTINUED | OUTPATIENT
Start: 2018-04-19 | End: 2018-04-21 | Stop reason: HOSPADM

## 2018-04-19 RX ORDER — OXYCODONE AND ACETAMINOPHEN 5; 325 MG/1; MG/1
1 TABLET ORAL EVERY 4 HOURS PRN
Status: DISCONTINUED | OUTPATIENT
Start: 2018-04-19 | End: 2018-04-19

## 2018-04-19 RX ORDER — CHLORHEXIDINE GLUCONATE ORAL RINSE 1.2 MG/ML
15 SOLUTION DENTAL 2 TIMES DAILY
Status: DISCONTINUED | OUTPATIENT
Start: 2018-04-19 | End: 2018-04-21 | Stop reason: HOSPADM

## 2018-04-19 RX ORDER — NEOSTIGMINE METHYLSULFATE 1 MG/ML
INJECTION, SOLUTION INTRAVENOUS
Status: DISCONTINUED | OUTPATIENT
Start: 2018-04-19 | End: 2018-04-19

## 2018-04-19 RX ORDER — AMOXICILLIN AND CLAVULANATE POTASSIUM 875; 125 MG/1; MG/1
1 TABLET, FILM COATED ORAL EVERY 12 HOURS
Status: DISCONTINUED | OUTPATIENT
Start: 2018-04-19 | End: 2018-04-21 | Stop reason: HOSPADM

## 2018-04-19 RX ORDER — HYDROMORPHONE HYDROCHLORIDE 1 MG/ML
0.2 INJECTION, SOLUTION INTRAMUSCULAR; INTRAVENOUS; SUBCUTANEOUS EVERY 5 MIN PRN
Status: COMPLETED | OUTPATIENT
Start: 2018-04-19 | End: 2018-04-19

## 2018-04-19 RX ADMIN — ATROPINE SULFATE 2 DROP: 10 SOLUTION OPHTHALMIC at 10:04

## 2018-04-19 RX ADMIN — SODIUM CHLORIDE, SODIUM GLUCONATE, SODIUM ACETATE, POTASSIUM CHLORIDE, MAGNESIUM CHLORIDE, SODIUM PHOSPHATE, DIBASIC, AND POTASSIUM PHOSPHATE: .53; .5; .37; .037; .03; .012; .00082 INJECTION, SOLUTION INTRAVENOUS at 04:04

## 2018-04-19 RX ADMIN — BALANCED SALT SOLUTION 10 ML: 6.4; .75; .48; .3; 3.9; 1.7 SOLUTION OPHTHALMIC at 04:04

## 2018-04-19 RX ADMIN — DEXMEDETOMIDINE HYDROCHLORIDE 1 MCG/KG/HR: 100 INJECTION, SOLUTION, CONCENTRATE INTRAVENOUS at 12:04

## 2018-04-19 RX ADMIN — KETAMINE HYDROCHLORIDE 20 MG: 100 INJECTION, SOLUTION, CONCENTRATE INTRAMUSCULAR; INTRAVENOUS at 12:04

## 2018-04-19 RX ADMIN — FENTANYL CITRATE 25 MCG: 50 INJECTION INTRAMUSCULAR; INTRAVENOUS at 08:04

## 2018-04-19 RX ADMIN — Medication 0.2 MG: at 08:04

## 2018-04-19 RX ADMIN — FENTANYL CITRATE 25 MCG: 50 INJECTION INTRAMUSCULAR; INTRAVENOUS at 09:04

## 2018-04-19 RX ADMIN — DEXMEDETOMIDINE HYDROCHLORIDE 59 MCG: 100 INJECTION, SOLUTION, CONCENTRATE INTRAVENOUS at 12:04

## 2018-04-19 RX ADMIN — CHLORHEXIDINE GLUCONATE 15 ML: 1.2 RINSE ORAL at 10:04

## 2018-04-19 RX ADMIN — FENTANYL CITRATE 50 MCG: 50 INJECTION, SOLUTION INTRAMUSCULAR; INTRAVENOUS at 03:04

## 2018-04-19 RX ADMIN — SODIUM CHLORIDE, SODIUM GLUCONATE, SODIUM ACETATE, POTASSIUM CHLORIDE, MAGNESIUM CHLORIDE, SODIUM PHOSPHATE, DIBASIC, AND POTASSIUM PHOSPHATE: .53; .5; .37; .037; .03; .012; .00082 INJECTION, SOLUTION INTRAVENOUS at 03:04

## 2018-04-19 RX ADMIN — OXYMETAZOLINE HYDROCHLORIDE 2 SPRAY: 5 SPRAY NASAL at 10:04

## 2018-04-19 RX ADMIN — Medication 0.2 MG: at 07:04

## 2018-04-19 RX ADMIN — DEXAMETHASONE SODIUM PHOSPHATE 8 MG: 4 INJECTION, SOLUTION INTRAMUSCULAR; INTRAVENOUS at 01:04

## 2018-04-19 RX ADMIN — LIDOCAINE HYDROCHLORIDE 4 ML: 40 SOLUTION TOPICAL at 12:04

## 2018-04-19 RX ADMIN — SODIUM CHLORIDE: 0.9 INJECTION, SOLUTION INTRAVENOUS at 11:04

## 2018-04-19 RX ADMIN — FENTANYL CITRATE 50 MCG: 50 INJECTION, SOLUTION INTRAMUSCULAR; INTRAVENOUS at 12:04

## 2018-04-19 RX ADMIN — BACITRACIN 100000 UNITS: 50000 INJECTION, POWDER, LYOPHILIZED, FOR SOLUTION INTRAMUSCULAR at 04:04

## 2018-04-19 RX ADMIN — ROCURONIUM BROMIDE 20 MG: 10 INJECTION, SOLUTION INTRAVENOUS at 02:04

## 2018-04-19 RX ADMIN — Medication 0.2 MG: at 06:04

## 2018-04-19 RX ADMIN — AMOXICILLIN AND CLAVULANATE POTASSIUM 1 TABLET: 875; 125 TABLET, FILM COATED ORAL at 10:04

## 2018-04-19 RX ADMIN — TOBRAMYCIN AND DEXAMETHASONE 2 DROP: 3; 1 SUSPENSION/ DROPS OPHTHALMIC at 11:04

## 2018-04-19 RX ADMIN — OXYMETAZOLINE HYDROCHLORIDE 2 SPRAY: 0.05 SPRAY NASAL at 12:04

## 2018-04-19 RX ADMIN — FENTANYL CITRATE 50 MCG: 50 INJECTION, SOLUTION INTRAMUSCULAR; INTRAVENOUS at 01:04

## 2018-04-19 RX ADMIN — LIDOCAINE HYDROCHLORIDE 0.1 MG: 10 INJECTION, SOLUTION EPIDURAL; INFILTRATION; INTRACAUDAL; PERINEURAL at 11:04

## 2018-04-19 RX ADMIN — LIDOCAINE HYDROCHLORIDE 5 ML: 20 SOLUTION OROPHARYNGEAL at 12:04

## 2018-04-19 RX ADMIN — GLYCOPYRROLATE 0.8 MG: 0.2 INJECTION, SOLUTION INTRAMUSCULAR; INTRAVENOUS at 05:04

## 2018-04-19 RX ADMIN — KETAMINE HYDROCHLORIDE 20 MG: 100 INJECTION, SOLUTION, CONCENTRATE INTRAMUSCULAR; INTRAVENOUS at 03:04

## 2018-04-19 RX ADMIN — BACITRACIN: 500 OINTMENT TOPICAL at 10:04

## 2018-04-19 RX ADMIN — PROPOFOL 200 MG: 10 INJECTION, EMULSION INTRAVENOUS at 12:04

## 2018-04-19 RX ADMIN — LIDOCAINE HYDROCHLORIDE AND EPINEPHRINE 10 ML: 10; 10 INJECTION, SOLUTION INFILTRATION; PERINEURAL at 02:04

## 2018-04-19 RX ADMIN — CEFAZOLIN 2 G: 330 INJECTION, POWDER, FOR SOLUTION INTRAMUSCULAR; INTRAVENOUS at 05:04

## 2018-04-19 RX ADMIN — MIDAZOLAM HYDROCHLORIDE 2 MG: 1 INJECTION, SOLUTION INTRAMUSCULAR; INTRAVENOUS at 12:04

## 2018-04-19 RX ADMIN — ROCURONIUM BROMIDE 20 MG: 10 INJECTION, SOLUTION INTRAVENOUS at 03:04

## 2018-04-19 RX ADMIN — FENTANYL CITRATE 100 MCG: 50 INJECTION, SOLUTION INTRAMUSCULAR; INTRAVENOUS at 02:04

## 2018-04-19 RX ADMIN — GLYCOPYRROLATE 0.2 MG: 0.2 INJECTION, SOLUTION INTRAMUSCULAR; INTRAVENOUS at 12:04

## 2018-04-19 RX ADMIN — CEFAZOLIN 2 G: 330 INJECTION, POWDER, FOR SOLUTION INTRAMUSCULAR; INTRAVENOUS at 01:04

## 2018-04-19 RX ADMIN — OXYCODONE HYDROCHLORIDE AND ACETAMINOPHEN 1 TABLET: 5; 325 TABLET ORAL at 06:04

## 2018-04-19 RX ADMIN — ROCURONIUM BROMIDE 50 MG: 10 INJECTION, SOLUTION INTRAVENOUS at 01:04

## 2018-04-19 RX ADMIN — SODIUM CHLORIDE, SODIUM GLUCONATE, SODIUM ACETATE, POTASSIUM CHLORIDE, MAGNESIUM CHLORIDE, SODIUM PHOSPHATE, DIBASIC, AND POTASSIUM PHOSPHATE: .53; .5; .37; .037; .03; .012; .00082 INJECTION, SOLUTION INTRAVENOUS at 01:04

## 2018-04-19 RX ADMIN — ROCURONIUM BROMIDE 10 MG: 10 INJECTION, SOLUTION INTRAVENOUS at 03:04

## 2018-04-19 RX ADMIN — DEXAMETHASONE SODIUM PHOSPHATE 4 MG: 4 INJECTION, SOLUTION INTRAMUSCULAR; INTRAVENOUS at 04:04

## 2018-04-19 RX ADMIN — KETAMINE HYDROCHLORIDE 10 MG: 100 INJECTION, SOLUTION, CONCENTRATE INTRAMUSCULAR; INTRAVENOUS at 12:04

## 2018-04-19 RX ADMIN — ROCURONIUM BROMIDE 20 MG: 10 INJECTION, SOLUTION INTRAVENOUS at 04:04

## 2018-04-19 RX ADMIN — PROPOFOL 150 MG: 10 INJECTION, EMULSION INTRAVENOUS at 12:04

## 2018-04-19 RX ADMIN — ROCURONIUM BROMIDE 30 MG: 10 INJECTION, SOLUTION INTRAVENOUS at 02:04

## 2018-04-19 RX ADMIN — FENTANYL CITRATE 100 MCG: 50 INJECTION, SOLUTION INTRAMUSCULAR; INTRAVENOUS at 01:04

## 2018-04-19 RX ADMIN — DEXMEDETOMIDINE HYDROCHLORIDE 59 MCG: 100 INJECTION, SOLUTION, CONCENTRATE INTRAVENOUS at 05:04

## 2018-04-19 RX ADMIN — DEXTROSE MONOHYDRATE, SODIUM CHLORIDE, AND POTASSIUM CHLORIDE: 50; 9; 1.49 INJECTION, SOLUTION INTRAVENOUS at 06:04

## 2018-04-19 RX ADMIN — ONDANSETRON 4 MG: 2 INJECTION INTRAMUSCULAR; INTRAVENOUS at 05:04

## 2018-04-19 RX ADMIN — NEOSTIGMINE METHYLSULFATE 5 MG: 1 INJECTION INTRAVENOUS at 05:04

## 2018-04-19 NOTE — INTERVAL H&P NOTE
The patient has been examined and the H&P has been reviewed:    I concur with the findings and no changes have occurred since H&P was written.    Anesthesia/Surgery risks, benefits and alternative options discussed and understood by patient/family.          Active Hospital Problems    Diagnosis  POA    Closed extensive facial fractures [S02.92XA]  Yes      Resolved Hospital Problems    Diagnosis Date Resolved POA   No resolved problems to display.

## 2018-04-19 NOTE — TRANSFER OF CARE
Anesthesia Transfer of Care Note    Patient: Heri Gordillo    Procedure(s) Performed: Procedure(s) (LRB):  REDUCTION-FACIAL FRACTURE (Bilateral)    Patient location: PACU    Anesthesia Type: general    Transport from OR: Transported from OR on 6-10 L/min O2 by face mask with adequate spontaneous ventilation    Post pain: adequate analgesia    Post assessment: no apparent anesthetic complications and tolerated procedure well    Post vital signs: stable    Level of consciousness: awake    Nausea/Vomiting: no nausea/vomiting    Complications: none    Transfer of care protocol was followed      Last vitals:   Visit Vitals  /74 (BP Location: Left arm, Patient Position: Lying)   Pulse 82   Temp 36.3 °C (97.3 °F) (Temporal)   Resp 20   Ht 6' (1.829 m)   Wt 117.7 kg (259 lb 7.7 oz)   SpO2 98%   BMI 35.19 kg/m²

## 2018-04-19 NOTE — BRIEF OP NOTE
[facility]  Brief Operative Note    SUMMARY     Surgery Date: 4/19/2018     Surgeon(s) and Role:     * Pritesh Rangel MD - Primary     * Troy Church MD - Resident - Assisting    Pre-op Diagnosis:  Malocclusion [M26.4]  Closed Le Fort I fracture, initial encounter [S02.411A]  Open Le [place] II fracture, initial encounter [S02.412B]  Open blow-out fracture of orbital floor, initial encounter [S02.30XB]  Open Le Fort III fracture, initial encounter [S02.413B]    Post-op Diagnosis:  Post-Op Diagnosis Codes:     * Malocclusion [M26.4]     * Closed Le Fort I fracture, initial encounter [S02.411A]     * Open Le [place] II fracture, initial encounter [S02.412B]     * Open blow-out fracture of orbital floor, initial encounter [S02.30XB]     * Open Le Fort III fracture, initial encounter [S02.413B]    Procedure(s) (LRB):  REDUCTION-FACIAL FRACTURE (Bilateral)    Anesthesia: General    Description of Procedure: ORIF bilateral orbital floor fractures, SAMUEL fx, left le fort III & I, right Le fort II with MMF    Description of the findings of the procedure: see opnote    Estimated Blood Loss: 50 mL         Specimens:   Specimen (12h ago through future)    None
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
## 2018-04-20 LAB
CREAT SERPL-MCNC: 0.8 MG/DL
EST. GFR  (AFRICAN AMERICAN): >60 ML/MIN/1.73 M^2
EST. GFR  (NON AFRICAN AMERICAN): >60 ML/MIN/1.73 M^2

## 2018-04-20 PROCEDURE — 36415 COLL VENOUS BLD VENIPUNCTURE: CPT

## 2018-04-20 PROCEDURE — 63600175 PHARM REV CODE 636 W HCPCS

## 2018-04-20 PROCEDURE — 82565 ASSAY OF CREATININE: CPT

## 2018-04-20 PROCEDURE — 25000003 PHARM REV CODE 250

## 2018-04-20 PROCEDURE — 25000003 PHARM REV CODE 250: Performed by: STUDENT IN AN ORGANIZED HEALTH CARE EDUCATION/TRAINING PROGRAM

## 2018-04-20 RX ORDER — IBUPROFEN 400 MG/1
800 TABLET ORAL EVERY 6 HOURS PRN
Status: DISCONTINUED | OUTPATIENT
Start: 2018-04-20 | End: 2018-04-21 | Stop reason: HOSPADM

## 2018-04-20 RX ORDER — OXYCODONE AND ACETAMINOPHEN 10; 325 MG/1; MG/1
1 TABLET ORAL EVERY 4 HOURS PRN
Status: DISCONTINUED | OUTPATIENT
Start: 2018-04-20 | End: 2018-04-21 | Stop reason: HOSPADM

## 2018-04-20 RX ORDER — DIPHENHYDRAMINE HCL 25 MG
25 CAPSULE ORAL EVERY 6 HOURS PRN
Status: DISCONTINUED | OUTPATIENT
Start: 2018-04-20 | End: 2018-04-21 | Stop reason: HOSPADM

## 2018-04-20 RX ADMIN — DIPHENHYDRAMINE HYDROCHLORIDE 25 MG: 25 CAPSULE ORAL at 09:04

## 2018-04-20 RX ADMIN — OXYMETAZOLINE HYDROCHLORIDE 2 SPRAY: 5 SPRAY NASAL at 09:04

## 2018-04-20 RX ADMIN — TOBRAMYCIN AND DEXAMETHASONE 2 DROP: 3; 1 SUSPENSION/ DROPS OPHTHALMIC at 05:04

## 2018-04-20 RX ADMIN — OXYCODONE HYDROCHLORIDE AND ACETAMINOPHEN 1 TABLET: 10; 325 TABLET ORAL at 02:04

## 2018-04-20 RX ADMIN — OXYCODONE HYDROCHLORIDE AND ACETAMINOPHEN 1 TABLET: 10; 325 TABLET ORAL at 09:04

## 2018-04-20 RX ADMIN — DIPHENHYDRAMINE HYDROCHLORIDE 25 MG: 25 CAPSULE ORAL at 02:04

## 2018-04-20 RX ADMIN — BACITRACIN: 500 OINTMENT TOPICAL at 03:04

## 2018-04-20 RX ADMIN — PANTOPRAZOLE SODIUM 40 MG: 40 TABLET, DELAYED RELEASE ORAL at 08:04

## 2018-04-20 RX ADMIN — TOBRAMYCIN AND DEXAMETHASONE 2 DROP: 3; 1 SUSPENSION/ DROPS OPHTHALMIC at 09:04

## 2018-04-20 RX ADMIN — ATROPINE SULFATE 2 DROP: 10 SOLUTION OPHTHALMIC at 09:04

## 2018-04-20 RX ADMIN — OXYCODONE HYDROCHLORIDE 10 MG: 5 TABLET ORAL at 02:04

## 2018-04-20 RX ADMIN — CHLORHEXIDINE GLUCONATE 15 ML: 1.2 RINSE ORAL at 08:04

## 2018-04-20 RX ADMIN — ATROPINE SULFATE 2 DROP: 10 SOLUTION OPHTHALMIC at 08:04

## 2018-04-20 RX ADMIN — BACITRACIN: 500 OINTMENT TOPICAL at 08:04

## 2018-04-20 RX ADMIN — TOBRAMYCIN AND DEXAMETHASONE 2 DROP: 3; 1 SUSPENSION/ DROPS OPHTHALMIC at 06:04

## 2018-04-20 RX ADMIN — IBUPROFEN 800 MG: 400 TABLET, FILM COATED ORAL at 06:04

## 2018-04-20 RX ADMIN — AMOXICILLIN AND CLAVULANATE POTASSIUM 1 TABLET: 875; 125 TABLET, FILM COATED ORAL at 08:04

## 2018-04-20 RX ADMIN — CHLORHEXIDINE GLUCONATE 15 ML: 1.2 RINSE ORAL at 10:04

## 2018-04-20 RX ADMIN — ONDANSETRON 8 MG: 8 TABLET, ORALLY DISINTEGRATING ORAL at 11:04

## 2018-04-20 RX ADMIN — MORPHINE SULFATE 4 MG: 2 INJECTION, SOLUTION INTRAMUSCULAR; INTRAVENOUS at 07:04

## 2018-04-20 RX ADMIN — OXYCODONE HYDROCHLORIDE 10 MG: 5 TABLET ORAL at 07:04

## 2018-04-20 RX ADMIN — AMOXICILLIN AND CLAVULANATE POTASSIUM 1 TABLET: 875; 125 TABLET, FILM COATED ORAL at 09:04

## 2018-04-20 RX ADMIN — BACITRACIN: 500 OINTMENT TOPICAL at 09:04

## 2018-04-20 NOTE — ANESTHESIA RELEASE NOTE
Anesthesia Release from PACU Note    Patient: Heri Gordillo    Procedure(s) Performed: Procedure(s) (LRB):  REDUCTION-FACIAL FRACTURE (Bilateral)    Anesthesia type: general    Post pain: Adequate analgesia    Post assessment: no apparent anesthetic complications, tolerated procedure well and no evidence of recall    Last Vitals:   Visit Vitals  /69   Pulse 82   Temp 36.6 °C (97.9 °F) (Oral)   Resp 18   Ht 6' (1.829 m)   Wt 117.7 kg (259 lb 7.7 oz)   SpO2 (!) 92%   BMI 35.19 kg/m²       Post vital signs: stable    Level of consciousness: awake, alert  and oriented    Nausea/Vomiting: no nausea/no vomiting    Complications: none    Airway Patency: patent    Respiratory: unassisted    Cardiovascular: stable and blood pressure at baseline    Hydration: euvolemic
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
## 2018-04-20 NOTE — PLAN OF CARE
Problem: Patient Care Overview  Goal: Plan of Care Review  Outcome: Ongoing (interventions implemented as appropriate)  Pt AAOx4 and VSS. Pt is progressing with plan of care. Free of skin breakdown as the pt positioned/repositioned well independently. Clean, dry, and intact dressing noted on nose. SCDs in place at all times. Incentive spirometer at bedside and pt instructed on its use. Pain controlled well with PRN meds. Frequent rounds made to assess pain and safety and no complaints at this time noted. Side rails up x 2. Bed locked. Call light within reach. No falls noted. Will continue to monitor.

## 2018-04-20 NOTE — ANESTHESIA POSTPROCEDURE EVALUATION
Anesthesia Post Evaluation    Patient: Heir Gordillo    Procedure(s) Performed: Procedure(s) (LRB):  REDUCTION-FACIAL FRACTURE (Bilateral)    Final Anesthesia Type: general  Patient location during evaluation: PACU  Patient participation: Yes- Able to Participate  Level of consciousness: awake and alert  Post-procedure vital signs: reviewed and stable  Pain management: adequate  Airway patency: patent  PONV status at discharge: No PONV  Anesthetic complications: no      Cardiovascular status: blood pressure returned to baseline  Respiratory status: unassisted, spontaneous ventilation and room air  Hydration status: euvolemic  Follow-up not needed.        Visit Vitals  /69   Pulse 82   Temp 36.6 °C (97.9 °F) (Oral)   Resp 18   Ht 6' (1.829 m)   Wt 117.7 kg (259 lb 7.7 oz)   SpO2 (!) 92%   BMI 35.19 kg/m²       Pain/Per Score: Pain Assessment Performed: Yes (4/19/2018  9:15 PM)  Presence of Pain: complains of pain/discomfort (4/19/2018  9:15 PM)  Pain Rating Prior to Med Admin: 5 (4/19/2018  9:06 PM)  Per Score: 10 (4/19/2018  9:15 PM)

## 2018-04-20 NOTE — PLAN OF CARE
Problem: Patient Care Overview  Goal: Plan of Care Review  Outcome: Ongoing (interventions implemented as appropriate)  Patient resting in bed comfortably. IV intact and infusing with no signs of irritation. Fall precautions maintained with no falls noted. Call light in reach bed locked and in lowest position. Non-skid socks on while out of bed. patient instructed to call for assistance. Skin integrity maintained as patient is independent with frequent positioning. C/o pain and nausea managed with prn meds. No other complaints or concerns. Progressing towards goals. Will continue to monitor and follow plan of care.

## 2018-04-20 NOTE — PROGRESS NOTES
[facility]  Otorhinolaryngology-Head & Neck Surgery  Progress Note    Subjective:     Post-Op Info:  Procedure(s) (LRB):  REDUCTION-FACIAL FRACTURE (Bilateral)   1 Day Post-Op  Hospital Day: 2     Interval History: Pain control not optimized.     Medications:  Continuous Infusions:   dextrose 5 % and 0.9 % NaCl with KCl 20 mEq 50 mL/hr at 04/19/18 1811     Scheduled Meds:   amoxicillin-clavulanate 875-125mg  1 tablet Oral Q12H    atropine 1%  2 drop Both Eyes BID    bacitracin   Topical (Top) TID    chlorhexidine  15 mL Mouth/Throat BID    pantoprazole  40 mg Oral Daily    tobramycin-dexamethasone 0.3-0.1%  2 drop Both Eyes Q4H While awake     PRN Meds:diphenhydrAMINE, ibuprofen, morphine, ondansetron, oxyCODONE, oxymetazoline, promethazine (PHENERGAN) IVPB, ramelteon, sodium chloride, sodium chloride 0.9%     Review of patient's allergies indicates:   Allergen Reactions    Mucinex [guaifenesin] Hives    Tylenol [acetaminophen] Hives     Objective:     Vital Signs (24h Range):  Temp:  [97.3 °F (36.3 °C)-98.9 °F (37.2 °C)] 97.8 °F (36.6 °C)  Pulse:  [63-96] 69  Resp:  [10-20] 18  SpO2:  [92 %-100 %] 96 %  BP: (115-139)/(66-88) 135/77        Lines/Drains/Airways     Peripheral Intravenous Line                 Peripheral IV - Single Lumen 04/19/18 1112 Right Hand less than 1 day         Peripheral IV - Single Lumen 04/19/18 1305 Left Hand less than 1 day                Physical Exam  AAOx3, NAD  EOM in tact  No acute changes in vision  Nasal bridge incision c/d/i    Significant Labs:  None    Significant Diagnostics:  None    Assessment/Plan:     Closed extensive facial fractures    POD 1 from pan facial fx repair  - Will add benadryl and motrin to regime\  - full liquid diet  - Tobradex eye drops  - d/c pending pain control             Troy Church MD  Otorhinolaryngology-Head & Neck Surgery  [facility]
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
## 2018-04-20 NOTE — SUBJECTIVE & OBJECTIVE
Interval History: Pain control not optimized.     Medications:  Continuous Infusions:   dextrose 5 % and 0.9 % NaCl with KCl 20 mEq 50 mL/hr at 04/19/18 1811     Scheduled Meds:   amoxicillin-clavulanate 875-125mg  1 tablet Oral Q12H    atropine 1%  2 drop Both Eyes BID    bacitracin   Topical (Top) TID    chlorhexidine  15 mL Mouth/Throat BID    pantoprazole  40 mg Oral Daily    tobramycin-dexamethasone 0.3-0.1%  2 drop Both Eyes Q4H While awake     PRN Meds:diphenhydrAMINE, ibuprofen, morphine, ondansetron, oxyCODONE, oxymetazoline, promethazine (PHENERGAN) IVPB, ramelteon, sodium chloride, sodium chloride 0.9%     Review of patient's allergies indicates:   Allergen Reactions    Mucinex [guaifenesin] Hives    Tylenol [acetaminophen] Hives     Objective:     Vital Signs (24h Range):  Temp:  [97.3 °F (36.3 °C)-98.9 °F (37.2 °C)] 97.8 °F (36.6 °C)  Pulse:  [63-96] 69  Resp:  [10-20] 18  SpO2:  [92 %-100 %] 96 %  BP: (115-139)/(66-88) 135/77        Lines/Drains/Airways     Peripheral Intravenous Line                 Peripheral IV - Single Lumen 04/19/18 1112 Right Hand less than 1 day         Peripheral IV - Single Lumen 04/19/18 1305 Left Hand less than 1 day                Physical Exam  AAOx3, NAD  EOM in tact  No acute changes in vision  Nasal bridge incision c/d/i    Significant Labs:  None    Significant Diagnostics:  None

## 2018-04-20 NOTE — NURSING TRANSFER
Nursing Transfer Note      4/19/2018     Transfer To: [address]    Transfer via stretcher    Transported by PCT    Chart send with patient: Yes    Notified: spouse    Patient reassessed at: 04/19/2018 1455

## 2018-04-21 VITALS
HEART RATE: 65 BPM | DIASTOLIC BLOOD PRESSURE: 68 MMHG | WEIGHT: 245 LBS | BODY MASS INDEX: 33.18 KG/M2 | SYSTOLIC BLOOD PRESSURE: 127 MMHG | HEIGHT: 72 IN | OXYGEN SATURATION: 95 % | RESPIRATION RATE: 18 BRPM | TEMPERATURE: 97 F

## 2018-04-21 PROCEDURE — 25000003 PHARM REV CODE 250

## 2018-04-21 RX ORDER — OXYCODONE AND ACETAMINOPHEN 10; 325 MG/1; MG/1
1 TABLET ORAL EVERY 4 HOURS PRN
Qty: 30 TABLET | Refills: 0 | Status: SHIPPED | OUTPATIENT
Start: 2018-04-21

## 2018-04-21 RX ORDER — AMOXICILLIN AND CLAVULANATE POTASSIUM 875; 125 MG/1; MG/1
1 TABLET, FILM COATED ORAL 2 TIMES DAILY
Qty: 14 TABLET | Refills: 0 | Status: SHIPPED | OUTPATIENT
Start: 2018-04-21

## 2018-04-21 RX ORDER — CHLORHEXIDINE GLUCONATE ORAL RINSE 1.2 MG/ML
15 SOLUTION DENTAL
Qty: 473 ML | Refills: 0 | Status: SHIPPED | OUTPATIENT
Start: 2018-04-21 | End: 2018-05-05

## 2018-04-21 RX ORDER — ONDANSETRON 4 MG/1
4 TABLET, ORALLY DISINTEGRATING ORAL EVERY 8 HOURS PRN
Qty: 15 TABLET | Refills: 0 | Status: SHIPPED | OUTPATIENT
Start: 2018-04-21

## 2018-04-21 RX ORDER — AMOXICILLIN AND CLAVULANATE POTASSIUM 875; 125 MG/1; MG/1
1 TABLET, FILM COATED ORAL EVERY 12 HOURS
Qty: 14 TABLET | Refills: 0 | Status: SHIPPED | OUTPATIENT
Start: 2018-04-21 | End: 2018-04-21 | Stop reason: HOSPADM

## 2018-04-21 RX ADMIN — OXYCODONE HYDROCHLORIDE AND ACETAMINOPHEN 1 TABLET: 10; 325 TABLET ORAL at 03:04

## 2018-04-21 RX ADMIN — TOBRAMYCIN AND DEXAMETHASONE 2 DROP: 3; 1 SUSPENSION/ DROPS OPHTHALMIC at 06:04

## 2018-04-21 RX ADMIN — IBUPROFEN 800 MG: 400 TABLET, FILM COATED ORAL at 06:04

## 2018-04-21 RX ADMIN — OXYCODONE HYDROCHLORIDE AND ACETAMINOPHEN 1 TABLET: 10; 325 TABLET ORAL at 08:04

## 2018-04-21 RX ADMIN — AMOXICILLIN AND CLAVULANATE POTASSIUM 1 TABLET: 875; 125 TABLET, FILM COATED ORAL at 08:04

## 2018-04-21 RX ADMIN — ATROPINE SULFATE 2 DROP: 10 SOLUTION OPHTHALMIC at 08:04

## 2018-04-21 RX ADMIN — IBUPROFEN 800 MG: 400 TABLET, FILM COATED ORAL at 12:04

## 2018-04-21 RX ADMIN — BACITRACIN: 500 OINTMENT TOPICAL at 08:04

## 2018-04-21 RX ADMIN — DIPHENHYDRAMINE HYDROCHLORIDE 25 MG: 25 CAPSULE ORAL at 03:04

## 2018-04-21 RX ADMIN — DIPHENHYDRAMINE HYDROCHLORIDE 25 MG: 25 CAPSULE ORAL at 08:04

## 2018-04-21 RX ADMIN — PANTOPRAZOLE SODIUM 40 MG: 40 TABLET, DELAYED RELEASE ORAL at 08:04

## 2018-04-21 NOTE — PROGRESS NOTES
[facility]  Otorhinolaryngology-Head & Neck Surgery  Progress Note    Subjective:     Post-Op Info:  Procedure(s) (LRB):  REDUCTION-FACIAL FRACTURE (Bilateral)   2 Days Post-Op  Hospital Day: 3     Interval History: NAEON. Pain better controlled.     Medications:  Continuous Infusions:    Scheduled Meds:   amoxicillin-clavulanate 875-125mg  1 tablet Oral Q12H    atropine 1%  2 drop Both Eyes BID    bacitracin   Topical (Top) TID    chlorhexidine  15 mL Mouth/Throat BID    pantoprazole  40 mg Oral Daily    tobramycin-dexamethasone 0.3-0.1%  2 drop Both Eyes Q4H While awake     PRN Meds:diphenhydrAMINE, ibuprofen, morphine, ondansetron, oxyCODONE-acetaminophen, oxymetazoline, promethazine (PHENERGAN) IVPB, ramelteon, sodium chloride, sodium chloride 0.9%     Review of patient's allergies indicates:   Allergen Reactions    Mucinex [guaifenesin] Hives    Tylenol [acetaminophen] Hives     Objective:     Vital Signs (24h Range):  Temp:  [96.8 °F (36 °C)-98.7 °F (37.1 °C)] 96.8 °F (36 °C)  Pulse:  [65-98] 65  Resp:  [17-18] 18  SpO2:  [95 %-97 %] 95 %  BP: (107-132)/(60-84) 127/68        Lines/Drains/Airways          No matching active lines, drains, or airways          Physical Exam    AAOx3, NAD  EOM in tact  No acute changes in vision  Nasal bridge incision c/d/i with nasal cast     Significant Labs:  None    Significant Diagnostics:  None    Assessment/Plan:     * Closed extensive facial fractures    POD 2 from pan facial fx repair    - pain control   - peridex mouth rinse   - augmentin   - full liquid diet  - Tobradex eye drops  - discharge today             Arti Leslie MD  Otorhinolaryngology-Head & Neck Surgery  [facility]

## 2018-04-21 NOTE — NURSING
Patient and spouse verbalized understanding of discharge instructions. IV removed, catheter intact. No questions or concerns. Wheelchair requested.
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
## 2018-04-21 NOTE — ASSESSMENT & PLAN NOTE
POD 2 from pan facial fx repair    - pain control   - peridex mouth rinse   - augmentin   - full liquid diet  - Tobradex eye drops  - discharge today

## 2018-04-21 NOTE — SUBJECTIVE & OBJECTIVE
Interval History: NAEON. Pain better controlled.     Medications:  Continuous Infusions:    Scheduled Meds:   amoxicillin-clavulanate 875-125mg  1 tablet Oral Q12H    atropine 1%  2 drop Both Eyes BID    bacitracin   Topical (Top) TID    chlorhexidine  15 mL Mouth/Throat BID    pantoprazole  40 mg Oral Daily    tobramycin-dexamethasone 0.3-0.1%  2 drop Both Eyes Q4H While awake     PRN Meds:diphenhydrAMINE, ibuprofen, morphine, ondansetron, oxyCODONE-acetaminophen, oxymetazoline, promethazine (PHENERGAN) IVPB, ramelteon, sodium chloride, sodium chloride 0.9%     Review of patient's allergies indicates:   Allergen Reactions    Mucinex [guaifenesin] Hives    Tylenol [acetaminophen] Hives     Objective:     Vital Signs (24h Range):  Temp:  [96.8 °F (36 °C)-98.7 °F (37.1 °C)] 96.8 °F (36 °C)  Pulse:  [65-98] 65  Resp:  [17-18] 18  SpO2:  [95 %-97 %] 95 %  BP: (107-132)/(60-84) 127/68        Lines/Drains/Airways          No matching active lines, drains, or airways          Physical Exam    AAOx3, NAD  EOM in tact  No acute changes in vision  Nasal bridge incision c/d/i with nasal cast     Significant Labs:  None    Significant Diagnostics:  None

## 2018-04-21 NOTE — NURSING
VSS. Pt discharged. Home per MD order. Pt verbalized understanding discharge instruction. IV removed and tip intact. Medication recon done. Pt will leave via wheelchair escorted by staff. Written prescription given to pt.

## 2018-04-27 ENCOUNTER — OFFICE VISIT (OUTPATIENT)
Dept: OTOLARYNGOLOGY | Facility: CLINIC | Age: 33
End: 2018-04-27
Payer: COMMERCIAL

## 2018-04-27 VITALS
SYSTOLIC BLOOD PRESSURE: 118 MMHG | WEIGHT: 238.13 LBS | TEMPERATURE: 96 F | BODY MASS INDEX: 32.29 KG/M2 | HEART RATE: 100 BPM | DIASTOLIC BLOOD PRESSURE: 83 MMHG

## 2018-04-27 DIAGNOSIS — M26.4 MALOCCLUSION OF TEETH: ICD-10-CM

## 2018-04-27 DIAGNOSIS — S02.92XD CLOSED EXTENSIVE FACIAL FRACTURES WITH ROUTINE HEALING, SUBSEQUENT ENCOUNTER: Primary | ICD-10-CM

## 2018-04-27 PROCEDURE — 99999 PR PBB SHADOW E&M-EST. PATIENT-LVL III: CPT | Mod: PBBFAC,,, | Performed by: OTOLARYNGOLOGY

## 2018-04-27 PROCEDURE — 99024 POSTOP FOLLOW-UP VISIT: CPT | Mod: S$GLB,,, | Performed by: OTOLARYNGOLOGY
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
## 2018-05-02 ENCOUNTER — ANESTHESIA EVENT (OUTPATIENT)
Dept: SURGERY | Facility: HOSPITAL | Age: 33
End: 2018-05-02
Payer: COMMERCIAL

## 2018-05-02 ENCOUNTER — TELEPHONE (OUTPATIENT)
Dept: OTOLARYNGOLOGY | Facility: CLINIC | Age: 33
End: 2018-05-02

## 2018-05-02 NOTE — ANESTHESIA PREPROCEDURE EVALUATION
05/02/2018  Heri Gordillo is a 32 y.o., male with a pre-operative diagnosis of Closed extensive facial fractures [S02.92XA] who is scheduled for Procedure(s) (LRB):  REMOVAL-ARCH BARS (N/A).     Requested anesthesia type: General/MAC  Surgeon: Pritesh Rangel MD  Allergies:   Review of patient's allergies indicates:   Allergen Reactions    Mucinex [guaifenesin] Hives    Tylenol [acetaminophen] Hives     Vital Sign Range:    Chronic Medications:   No prescriptions prior to admission.     Current Medications:   No current facility-administered medications for this encounter.      Current Outpatient Prescriptions   Medication Sig Dispense Refill    amoxicillin-clavulanate 875-125mg (AUGMENTIN) 875-125 mg per tablet Take 1 tablet by mouth 2 (two) times daily. 14 tablet 0    atropine 1% (ISOPTO ATROPINE) 1 % Drop Place 2 drops into both eyes 2 (two) times daily. 5 Bottle 0    chlorhexidine (PERIDEX) 0.12 % solution Use as directed 15 mLs in the mouth or throat 3 (three) times daily after meals. Swish and spit 473 mL 0    ESOMEPRAZOLE MAGNESIUM ORAL Take by mouth.      hydrocodone-acetaminophen 5-325mg (NORCO) 5-325 mg per tablet Take 1 tablet by mouth every 6 (six) hours as needed for Pain. 20 tablet 0    ibuprofen (ADVIL,MOTRIN) 600 MG tablet Take 600 mg by mouth 3 (three) times daily.      ondansetron (ZOFRAN-ODT) 4 MG TbDL Take 1 tablet (4 mg total) by mouth every 8 (eight) hours as needed (for nausea). 15 tablet 0    oxyCODONE-acetaminophen (PERCOCET)  mg per tablet Take 1 tablet by mouth every 4 (four) hours as needed for Pain. 30 tablet 0     Medical History:   Past Medical History:   Diagnosis Date    Neck injury     Stomach ulcer     Tailbone injury      PRIOR AIRWAY MANAGEMENT:    Placement Date: 04/19/18; Placement Time: 1300; Method of Intubation: Fiberoptic Intubation; Inserted by:  Anesthesia MD; Airway Device: Nasotracheal Tube; Mask Ventilation: Easy; Intubated: Postinduction; Airway Device Size: 7.0; Style: Cuffed; Cuff Inflation: Minimal occlusive pressure; Inflation Amount: 7; Placement Verified By: Auscultation, Capnometry, Fiberoptic bronchoscopic inspection; Grade: Grade I; Complicating Factors:  (facial trauma); Intubation Findings: Positive EtCO2, Bilateral breath sounds, Atraumatic/Condition of teeth unchanged;  Depth of Insertion: 26; Securment: Naris; Complications: None; Breath Sounds: Equal Bilateral; Insertion Attempts: 2; Removal Date: 04/19/18;  Removal Time: 1646 04/19/18 1300 by Sadie Baker CRNA     .    Anesthesia Evaluation    I have reviewed the Patient Summary Reports.    I have reviewed the Nursing Notes.   I have reviewed the Medications.     Review of Systems  Anesthesia Hx:  No problems with previous Anesthesia  Denies Family Hx of Anesthesia complications.   Denies Personal Hx of Anesthesia complications.   Hematology/Oncology:  Hematology Normal   Oncology Normal     EENT/Dental:EENT/Dental Normal   Cardiovascular:  Cardiovascular Normal     Pulmonary:  Pulmonary Normal    Renal/:  Renal/ Normal     Hepatic/GI:  Hepatic/GI Normal    Musculoskeletal:  Musculoskeletal Normal    Neurological:  Neurology Normal    Endocrine:  Endocrine Normal    Dermatological:  Skin Normal    Psych:  Psychiatric Normal           Physical Exam   Airway/Jaw/Neck:  Airway Findings: Mouth Opening: Small, but > 3cm Tongue: Normal  General Airway Assessment: Adult, Good  Mallampati: II  Improves to II with phonation.  Jaw/Neck Findings:     Neck ROM: Normal ROM      Dental:  Dental Findings: In tact, upper braces, lower braces   Chest/Lungs:  Chest/Lungs Findings: Clear to auscultation, Normal Respiratory Rate     Heart/Vascular:  Heart Findings: Rate: Normal  Rhythm: Regular Rhythm  Sounds: Normal     Abdomen:  Abdomen Findings:  Normal, Soft, Nontender            Anesthesia  Plan  Type of Anesthesia, risks & benefits discussed:  Anesthesia Type:  general  Patient's Preference: as indicated  Intra-op Monitoring Plan: standard ASA monitors  Intra-op Monitoring Plan Comments:   Post Op Pain Control Plan:   Post Op Pain Control Plan Comments:   Induction:   IV  Beta Blocker:  Patient is not currently on a Beta-Blocker (No further documentation required).       Informed Consent: Patient understands risks and agrees with Anesthesia plan.  Questions answered. Anesthesia consent signed with patient.  ASA Score: 3     Day of Surgery Review of History & Physical:  There are no significant changes.  H&P update referred to the provider.     Anesthesia Plan Notes: Risks of dental and eye injury reviewed with patient, agrees to proceed. Reassurance given.        Ready For Surgery From Anesthesia Perspective.

## 2018-05-03 ENCOUNTER — HOSPITAL ENCOUNTER (OUTPATIENT)
Facility: HOSPITAL | Age: 33
Discharge: HOME | End: 2018-05-03
Attending: OTOLARYNGOLOGY | Admitting: OTOLARYNGOLOGY
Payer: COMMERCIAL

## 2018-05-03 ENCOUNTER — ANESTHESIA (OUTPATIENT)
Dept: SURGERY | Facility: HOSPITAL | Age: 33
End: 2018-05-03
Payer: COMMERCIAL

## 2018-05-03 VITALS
TEMPERATURE: 99 F | SYSTOLIC BLOOD PRESSURE: 131 MMHG | HEIGHT: 72 IN | RESPIRATION RATE: 16 BRPM | WEIGHT: 235 LBS | OXYGEN SATURATION: 96 % | HEART RATE: 74 BPM | BODY MASS INDEX: 31.83 KG/M2 | DIASTOLIC BLOOD PRESSURE: 82 MMHG

## 2018-05-03 DIAGNOSIS — S02.412D: ICD-10-CM

## 2018-05-03 PROCEDURE — 71000039 HC RECOVERY, EACH ADD'L HOUR: Performed by: OTOLARYNGOLOGY

## 2018-05-03 PROCEDURE — 25000003 PHARM REV CODE 250: Performed by: ANESTHESIOLOGY

## 2018-05-03 PROCEDURE — 25000003 PHARM REV CODE 250: Performed by: STUDENT IN AN ORGANIZED HEALTH CARE EDUCATION/TRAINING PROGRAM

## 2018-05-03 PROCEDURE — D9220A PRA ANESTHESIA: Mod: CRNA,,, | Performed by: NURSE ANESTHETIST, CERTIFIED REGISTERED

## 2018-05-03 PROCEDURE — 37000009 HC ANESTHESIA EA ADD 15 MINS: Performed by: OTOLARYNGOLOGY

## 2018-05-03 PROCEDURE — 25000003 PHARM REV CODE 250: Performed by: NURSE ANESTHETIST, CERTIFIED REGISTERED

## 2018-05-03 PROCEDURE — 63600175 PHARM REV CODE 636 W HCPCS: Performed by: OTOLARYNGOLOGY

## 2018-05-03 PROCEDURE — S0028 INJECTION, FAMOTIDINE, 20 MG: HCPCS | Performed by: ANESTHESIOLOGY

## 2018-05-03 PROCEDURE — 63600175 PHARM REV CODE 636 W HCPCS: Performed by: NURSE ANESTHETIST, CERTIFIED REGISTERED

## 2018-05-03 PROCEDURE — 36000708 HC OR TIME LEV III 1ST 15 MIN: Performed by: OTOLARYNGOLOGY

## 2018-05-03 PROCEDURE — 36000709 HC OR TIME LEV III EA ADD 15 MIN: Performed by: OTOLARYNGOLOGY

## 2018-05-03 PROCEDURE — 20680 REMOVAL OF IMPLANT DEEP: CPT | Mod: 58,,, | Performed by: OTOLARYNGOLOGY

## 2018-05-03 PROCEDURE — 88300 SURGICAL PATH GROSS: CPT | Performed by: PATHOLOGY

## 2018-05-03 PROCEDURE — 63600175 PHARM REV CODE 636 W HCPCS: Performed by: ANESTHESIOLOGY

## 2018-05-03 PROCEDURE — D9220A PRA ANESTHESIA: Mod: ANES,,, | Performed by: ANESTHESIOLOGY

## 2018-05-03 PROCEDURE — 71000033 HC RECOVERY, INTIAL HOUR: Performed by: OTOLARYNGOLOGY

## 2018-05-03 PROCEDURE — 37000008 HC ANESTHESIA 1ST 15 MINUTES: Performed by: OTOLARYNGOLOGY

## 2018-05-03 PROCEDURE — 71000015 HC POSTOP RECOV 1ST HR: Performed by: OTOLARYNGOLOGY

## 2018-05-03 RX ORDER — SODIUM CHLORIDE 0.9 % (FLUSH) 0.9 %
3 SYRINGE (ML) INJECTION
Status: DISCONTINUED | OUTPATIENT
Start: 2018-05-03 | End: 2018-05-03 | Stop reason: HOSPADM

## 2018-05-03 RX ORDER — HYDROMORPHONE HYDROCHLORIDE 1 MG/ML
0.2 INJECTION, SOLUTION INTRAMUSCULAR; INTRAVENOUS; SUBCUTANEOUS EVERY 5 MIN PRN
Status: DISCONTINUED | OUTPATIENT
Start: 2018-05-03 | End: 2018-05-03 | Stop reason: HOSPADM

## 2018-05-03 RX ORDER — MIDAZOLAM HYDROCHLORIDE 1 MG/ML
INJECTION, SOLUTION INTRAMUSCULAR; INTRAVENOUS
Status: DISCONTINUED | OUTPATIENT
Start: 2018-05-03 | End: 2018-05-03

## 2018-05-03 RX ORDER — PROPOFOL 10 MG/ML
VIAL (ML) INTRAVENOUS
Status: DISCONTINUED | OUTPATIENT
Start: 2018-05-03 | End: 2018-05-03

## 2018-05-03 RX ORDER — LIDOCAINE HYDROCHLORIDE 10 MG/ML
1 INJECTION, SOLUTION EPIDURAL; INFILTRATION; INTRACAUDAL; PERINEURAL ONCE
Status: COMPLETED | OUTPATIENT
Start: 2018-05-03 | End: 2018-05-03

## 2018-05-03 RX ORDER — SODIUM CHLORIDE 9 MG/ML
INJECTION, SOLUTION INTRAVENOUS CONTINUOUS PRN
Status: DISCONTINUED | OUTPATIENT
Start: 2018-05-03 | End: 2018-05-03

## 2018-05-03 RX ORDER — FAMOTIDINE 10 MG/ML
20 INJECTION INTRAVENOUS ONCE
Status: COMPLETED | OUTPATIENT
Start: 2018-05-03 | End: 2018-05-03

## 2018-05-03 RX ORDER — FENTANYL CITRATE 50 UG/ML
INJECTION, SOLUTION INTRAMUSCULAR; INTRAVENOUS
Status: DISCONTINUED | OUTPATIENT
Start: 2018-05-03 | End: 2018-05-03

## 2018-05-03 RX ORDER — LIDOCAINE HCL/PF 100 MG/5ML
SYRINGE (ML) INTRAVENOUS
Status: DISCONTINUED | OUTPATIENT
Start: 2018-05-03 | End: 2018-05-03

## 2018-05-03 RX ORDER — OXYCODONE HYDROCHLORIDE 5 MG/1
5 TABLET ORAL EVERY 6 HOURS PRN
Status: DISCONTINUED | OUTPATIENT
Start: 2018-05-03 | End: 2018-05-03 | Stop reason: HOSPADM

## 2018-05-03 RX ADMIN — LIDOCAINE HYDROCHLORIDE: 10 INJECTION, SOLUTION EPIDURAL; INFILTRATION; INTRACAUDAL; PERINEURAL at 12:05

## 2018-05-03 RX ADMIN — OXYCODONE HYDROCHLORIDE 5 MG: 5 TABLET ORAL at 06:05

## 2018-05-03 RX ADMIN — FENTANYL CITRATE 50 MCG: 50 INJECTION, SOLUTION INTRAMUSCULAR; INTRAVENOUS at 04:05

## 2018-05-03 RX ADMIN — Medication 0.2 MG: at 05:05

## 2018-05-03 RX ADMIN — FAMOTIDINE 20 MG: 10 INJECTION INTRAVENOUS at 12:05

## 2018-05-03 RX ADMIN — SODIUM CHLORIDE: 0.9 INJECTION, SOLUTION INTRAVENOUS at 03:05

## 2018-05-03 RX ADMIN — Medication 0.2 MG: at 04:05

## 2018-05-03 RX ADMIN — MIDAZOLAM HYDROCHLORIDE 2 MG: 1 INJECTION, SOLUTION INTRAMUSCULAR; INTRAVENOUS at 04:05

## 2018-05-03 RX ADMIN — AMPICILLIN SODIUM AND SULBACTAM SODIUM 3 G: 2; 1 INJECTION, POWDER, FOR SOLUTION INTRAMUSCULAR; INTRAVENOUS at 04:05

## 2018-05-03 RX ADMIN — LIDOCAINE HYDROCHLORIDE 100 MG: 20 INJECTION, SOLUTION INTRAVENOUS at 04:05

## 2018-05-03 RX ADMIN — SODIUM CHLORIDE 500 ML: 0.9 INJECTION, SOLUTION INTRAVENOUS at 12:05

## 2018-05-03 RX ADMIN — PROPOFOL 200 MG: 10 INJECTION, EMULSION INTRAVENOUS at 04:05

## 2018-05-03 NOTE — DISCHARGE INSTRUCTIONS
Recovery After Procedural Sedation (Adult)  You have been given medicine by vein to make you sleep during your surgery. This may have included both a pain medicine and sleeping medicine. Most of the effects have worn off. But you may still have some drowsiness for the next 6 to 8 hours.  Home care  Follow these guidelines when you get home:  · For the next 8 hours, you should be watched by a responsible adult. This person should make sure your condition is not getting worse.  · Don't drink any alcohol for the next 24 hours.  · Don't drive, operate dangerous machinery, or make important business or personal decisions during the next 24 hours.  Note: Your healthcare provider may tell you not to take any medicine by mouth for pain or sleep in the next 4 hours. These medicines may react with the medicines you were given in the hospital. This could cause a much stronger response than usual.  Follow-up care  Follow up with your healthcare provider if you are not alert and back to your usual level of activity within 12 hours.  When to seek medical advice  Call your healthcare provider right away if any of these occur:  · Drowsiness gets worse  · Weakness or dizziness gets worse  · Repeated vomiting  · You can't be awakened   Date Last Reviewed: 10/18/2016  © 2902-7508 [employer]. [address] [place] [address], [place] [address]. All rights reserved. This information is not intended as a substitute for professional medical care. Always follow your healthcare professional's instructions.

## 2018-05-03 NOTE — BRIEF OP NOTE
[facility]  Brief Operative Note     SUMMARY     Surgery Date: 5/3/2018     Surgeon(s) and Role:     * Pritesh Rangel MD - Primary     * Morales Nolasco MD - Resident - Assisting      Pre-op Diagnosis:  Closed extensive facial fractures [S02.92XA]    Post-op Diagnosis:  Post-Op Diagnosis Codes:     * Closed extensive facial fractures [S02.92XA]    Procedure(s) (LRB):  REMOVAL-ARCH BARS (N/A)    Anesthesia: General/MAC    Description of the findings of the procedure: Removal of maxillary arch bars    Findings/Key Components: See op note    Estimated Blood Loss: Less than 3cc         Specimens:   Specimen (12h ago through future)    Start     Ordered    05/03/18 1632  Specimen to Pathology - Surgery  Once     Comments:  1. Arch bar - gross only      05/03/18 1632          Discharge Note    SUMMARY     Admit Date: 5/3/2018    Discharge Date and Time:  05/03/2018 4:45 PM    Hospital Course (synopsis of major diagnoses, care, treatment, and services provided during the course of the hospital stay): Patient presented for scheduled surgical procedure. Underwent arch bar removal with out issue. Did well post op, was discharged home.      Final Diagnosis: Post-Op Diagnosis Codes:     * Closed extensive facial fractures [S02.92XA]    Disposition: Home or Self Care    Follow Up/Patient Instructions:     Medications:  Reconciled Home Medications:      Medication List      CONTINUE taking these medications    amoxicillin-clavulanate 875-125mg 875-125 mg per tablet  Commonly known as:  AUGMENTIN  Take 1 tablet by mouth 2 (two) times daily.     atropine 1% 1 % Drop  Commonly known as:  ISOPTO ATROPINE  Place 2 drops into both eyes 2 (two) times daily.     chlorhexidine 0.12 % solution  Commonly known as:  PERIDEX  Use as directed 15 mLs in the mouth or throat 3 (three) times daily after meals. Swish and spit     ESOMEPRAZOLE MAGNESIUM ORAL  Take by mouth.     hydrocodone-acetaminophen 5-325mg 5-325 mg per  tablet  Commonly known as:  NORCO  Take 1 tablet by mouth every 6 (six) hours as needed for Pain.     ibuprofen 600 MG tablet  Commonly known as:  ADVIL,MOTRIN  Take 600 mg by mouth 3 (three) times daily.     ondansetron 4 MG Tbdl  Commonly known as:  ZOFRAN-ODT  Take 1 tablet (4 mg total) by mouth every 8 (eight) hours as needed (for nausea).     oxyCODONE-acetaminophen  mg per tablet  Commonly known as:  PERCOCET  Take 1 tablet by mouth every 4 (four) hours as needed for Pain.            Discharge Procedure Orders  Diet full liquid   Order Comments: Soft dental diet, advance as tolerated to regular. Drink as much water as possible.     Activity as tolerated   Order Comments: Light activity as tolerated. Please continue antibiotics to completion. Please continue Peridex mouth rinses (before/after each meal) for 3 days at least days (or until completion of bottle. Please ensure good dental hygiene, brush teeth at least twice daily.     Notify your health care provider if you experience any of the following:  temperature >100.4     Notify your health care provider if you experience any of the following:  persistent nausea and vomiting or diarrhea     Notify your health care provider if you experience any of the following:  severe uncontrolled pain     Notify your health care provider if you experience any of the following:  redness, tenderness, or signs of infection (pain, swelling, redness, odor or green/yellow discharge around incision site)     Notify your health care provider if you experience any of the following:  difficulty breathing or increased cough     Notify your health care provider if you experience any of the following:  severe persistent headache     Notify your health care provider if you experience any of the following:   Order Comments: A small amount of bleeding from the gums can be expected, apply pressure with gauze. For bleeding that does not stop with pressure, please present to ER.      No dressing needed       Follow-up Information     Pritesh Rangel MD In 2 weeks.    Specialty:  Otolaryngology  Why:  Post op  Contact information:  [address]  [facility] [id number] [phone]

## 2018-05-03 NOTE — TRANSFER OF CARE
Anesthesia Transfer of Care Note    Patient: Heri Gordillo    Procedure(s) Performed: Procedure(s) (LRB):  REMOVAL-ARCH BARS (N/A)    Patient location: PACU    Anesthesia Type: general    Transport from OR: Transported from OR on 6-10 L/min O2 by face mask with adequate spontaneous ventilation    Post assessment: no apparent anesthetic complications and tolerated procedure well    Post vital signs: stable    Level of consciousness: awake, alert and oriented    Nausea/Vomiting: no nausea/vomiting    Complications: none    Transfer of care protocol was followed      Last vitals:   Visit Vitals  /82   Pulse 88   Temp 36.5 °C (97.7 °F) (Temporal)   Resp 18   Ht 6' (1.829 m)   Wt 106.6 kg (235 lb)   SpO2 96%   BMI 31.87 kg/m²

## 2018-05-03 NOTE — ANESTHESIA POSTPROCEDURE EVALUATION
Anesthesia Post Evaluation    Patient: Heri Gordillo    Procedure(s) Performed: Procedure(s) (LRB):  REMOVAL-ARCH BARS (N/A)    Final Anesthesia Type: general  Patient location during evaluation: PACU  Patient participation: Yes- Able to Participate  Level of consciousness: awake and alert  Post-procedure vital signs: reviewed and stable  Pain management: adequate  Airway patency: patent  PONV status at discharge: No PONV  Anesthetic complications: no      Cardiovascular status: blood pressure returned to baseline and stable  Respiratory status: unassisted  Hydration status: euvolemic  Follow-up not needed.        Visit Vitals  /65   Pulse 76   Temp 36.5 °C (97.7 °F) (Temporal)   Resp 15   Ht 6' (1.829 m)   Wt 106.6 kg (235 lb)   SpO2 (!) 94%   BMI 31.87 kg/m²       Pain/Per Score: Pain Assessment Performed: Yes (5/3/2018  4:30 PM)  Presence of Pain: complains of pain/discomfort (5/3/2018  4:30 PM)  Pain Rating Prior to Med Admin: 6 (5/3/2018  5:15 PM)  Pain Rating Post Med Admin: 6 (5/3/2018  4:30 PM)  Per Score: 10 (5/3/2018  4:30 PM)

## 2018-05-03 NOTE — INTERVAL H&P NOTE
The patient has been examined and the H&P has been reviewed:    I concur with the findings and no changes have occurred since H&P was written.    Anesthesia/Surgery risks, benefits and alternative options discussed and understood by patient/family.          Active Hospital Problems    Diagnosis  POA    LeFort II fracture with routine healing [S02.412D]  Not Applicable      Resolved Hospital Problems    Diagnosis Date Resolved POA   No resolved problems to display.

## 2018-05-03 NOTE — PLAN OF CARE
Discharge instructions reviewed with pt and wife. Understanding verbalized. Pt able to tolerate PO intake and urinate in restroom. Pt reports pain to be tolerable, PRN medications given. To be transported to car by PCT.

## 2018-05-04 NOTE — OP NOTE
DATE OF PROCEDURE:  05/03/2018.    SURGEON:  Pritesh Rangel M.D.    ASSISTANT:  Morales Nolasco M.D. (RES).    ANESTHESIA:  General endotracheal anesthesia.    PROCEDURE PERFORMED:  Removal of arch bars.    INDICATION FOR PROCEDURE AND PREOPERATIVE DIAGNOSES:  This is a young man who   sustained multiple comminuted and complex facial fractures including a Le Fort   III, Le Fort II and a two-piece Le Fort I.  The upper arch bar was left to   stabilize his upper dental arch at the time of his initial repair and he   presents now for removal.    POSTOPERATIVE DIAGNOSES:  This is a young man who sustained multiple comminuted   and complex facial fractures including a Le Fort III, Le Fort II and a two-piece   Le Fort I.  The upper arch bar was left to stabilize his upper dental arch at   the time of his initial repair and he presents now for removal.    PROCEDURE IN DETAIL:  After obtaining informed consent, the patient was taken to   the Operating Room in the supine position.  General endotracheal anesthesia was   induced without difficulty.  The area was prepped and draped in the standard   sterile fashion.  A needle  was used to grasp each of the interdental   wires and untwisted and these were delicately removed from the interdental   spaces thus releasing the arch bar.  The wires and arch bar were sent for gross   examination and pathology.  This concluded the procedure.  The patient was   rotated back to anesthesia, awakened in the Operating Room without difficulty.    There were no complications.    ESTIMATED BLOOD LOSS:  2 mL.      RDW/HN  dd: 05/04/2018 08:10:20 (CDT)  td: 05/04/2018 08:36:09 (CDT)  Doc ID   #[id number]  Job ID #[id number]    CC:

## 2018-05-14 ENCOUNTER — TELEPHONE (OUTPATIENT)
Dept: OTOLARYNGOLOGY | Facility: CLINIC | Age: 33
End: 2018-05-14

## 2018-05-14 NOTE — TELEPHONE ENCOUNTER
"----- Message from rPitesh Rangel MD sent at 5/14/2018 11:47 AM CDT -----  Contact: patient wife  He is light duty for another week, then can go back to full.    Pritesh  ----- Message -----  From: Bhakti Mcdonald RN  Sent: 5/14/2018  11:08 AM  To: Pritesh Rangel MD    Is he cleared to return to work? And "light duty" or full?     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

## 2018-05-15 ENCOUNTER — TELEPHONE (OUTPATIENT)
Dept: OTOLARYNGOLOGY | Facility: CLINIC | Age: 33
End: 2018-05-15

## 2018-05-15 NOTE — TELEPHONE ENCOUNTER
----- Message from Suzan Bowman sent at 5/15/2018  2:25 PM CDT -----  Contact: patient  Please call above patient needs paper work for job said he has called a couple of times need to speak with the nurse ASAP

## 2018-05-30 ENCOUNTER — OFFICE VISIT (OUTPATIENT)
Dept: OTOLARYNGOLOGY | Facility: CLINIC | Age: 33
End: 2018-05-30
Payer: COMMERCIAL

## 2018-05-30 VITALS
SYSTOLIC BLOOD PRESSURE: 116 MMHG | BODY MASS INDEX: 32.8 KG/M2 | DIASTOLIC BLOOD PRESSURE: 76 MMHG | WEIGHT: 241.88 LBS | HEART RATE: 84 BPM | TEMPERATURE: 99 F

## 2018-05-30 DIAGNOSIS — J34.89 NASAL STENOSIS: ICD-10-CM

## 2018-05-30 DIAGNOSIS — S02.92XD CLOSED EXTENSIVE FACIAL FRACTURES WITH ROUTINE HEALING, SUBSEQUENT ENCOUNTER: ICD-10-CM

## 2018-05-30 DIAGNOSIS — S02.412D: Primary | ICD-10-CM

## 2018-05-30 PROBLEM — S02.92XA CLOSED EXTENSIVE FACIAL FRACTURES: Status: RESOLVED | Noted: 2018-04-19 | Resolved: 2018-05-30

## 2018-05-30 PROCEDURE — 99999 PR PBB SHADOW E&M-EST. PATIENT-LVL III: CPT | Mod: PBBFAC,,, | Performed by: OTOLARYNGOLOGY

## 2018-05-30 PROCEDURE — 99024 POSTOP FOLLOW-UP VISIT: CPT | Mod: S$GLB,,, | Performed by: OTOLARYNGOLOGY

## 2018-05-30 NOTE — PROGRESS NOTES
[facility] NOTE     CC: F/U pan-facial fractures     TREATMENT HISTORY:  1. ORIF LeFort I, II, III, SAMUEL, B orbital blow-out, nasal fracture and arch bars, 4/19/2018     INTERVAL HISTORY:Heri Gordillo returns to the [facility] for follow-up of facial fractures. No complaints today.Denies dysphagia, odynophagia, throat pain and otalgia.Voice is stable. Does not experience dry mouth. Denies fevers, chills, and nightsweats. There has not been any redness or drainage from the wounds. His occlusion is similar to his pre-injury. No visual symptoms. Pain is decreasing. Edema resolved. Nasal breathing is good. Would like to return to full duty at work.    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  OK to return to work at full duty.  OK for dental rehabilitation.

## 2021-02-02 ENCOUNTER — HOSPITAL ENCOUNTER (EMERGENCY)
Facility: HOSPITAL | Age: 36
Discharge: HOME | End: 2021-02-02
Attending: EMERGENCY MEDICINE
Payer: COMMERCIAL

## 2021-02-02 VITALS
OXYGEN SATURATION: 96 % | TEMPERATURE: 98 F | DIASTOLIC BLOOD PRESSURE: 90 MMHG | HEART RATE: 87 BPM | WEIGHT: 235 LBS | SYSTOLIC BLOOD PRESSURE: 147 MMHG | HEIGHT: 71 IN | BODY MASS INDEX: 32.9 KG/M2 | RESPIRATION RATE: 18 BRPM

## 2021-02-02 DIAGNOSIS — J06.9 VIRAL URI WITH COUGH: ICD-10-CM

## 2021-02-02 DIAGNOSIS — J20.9 ACUTE BRONCHITIS, UNSPECIFIED ORGANISM: Primary | ICD-10-CM

## 2021-02-02 DIAGNOSIS — B34.9 VIRAL SYNDROME: ICD-10-CM

## 2021-02-02 DIAGNOSIS — R52 GENERALIZED BODY ACHES: ICD-10-CM

## 2021-02-02 DIAGNOSIS — R05.9 COUGH: ICD-10-CM

## 2021-02-02 DIAGNOSIS — R52 BODY ACHES: ICD-10-CM

## 2021-02-02 LAB
CTP QC/QA: YES
CTP QC/QA: YES
POC MOLECULAR INFLUENZA A AGN: NEGATIVE
POC MOLECULAR INFLUENZA B AGN: NEGATIVE
SARS-COV-2 RDRP RESP QL NAA+PROBE: NEGATIVE

## 2021-02-02 PROCEDURE — 25000003 PHARM REV CODE 250: Performed by: PHYSICIAN ASSISTANT

## 2021-02-02 PROCEDURE — 63600175 PHARM REV CODE 636 W HCPCS: Performed by: PHYSICIAN ASSISTANT

## 2021-02-02 PROCEDURE — 99284 EMERGENCY DEPT VISIT MOD MDM: CPT | Mod: 25

## 2021-02-02 PROCEDURE — U0002 COVID-19 LAB TEST NON-CDC: HCPCS | Performed by: PHYSICIAN ASSISTANT

## 2021-02-02 PROCEDURE — 96372 THER/PROPH/DIAG INJ SC/IM: CPT | Mod: 59

## 2021-02-02 PROCEDURE — 87502 INFLUENZA DNA AMP PROBE: CPT

## 2021-02-02 RX ORDER — AZITHROMYCIN 250 MG/1
250 TABLET, FILM COATED ORAL DAILY
Qty: 6 TABLET | Refills: 0 | Status: SHIPPED | OUTPATIENT
Start: 2021-02-02

## 2021-02-02 RX ORDER — BENZONATATE 100 MG/1
100 CAPSULE ORAL 3 TIMES DAILY PRN
Qty: 20 CAPSULE | Refills: 0 | Status: SHIPPED | OUTPATIENT
Start: 2021-02-02 | End: 2021-02-12

## 2021-02-02 RX ORDER — ALBUTEROL SULFATE 90 UG/1
1-2 AEROSOL, METERED RESPIRATORY (INHALATION) EVERY 6 HOURS PRN
Qty: 8 G | Refills: 0 | Status: SHIPPED | OUTPATIENT
Start: 2021-02-02 | End: 2022-02-02

## 2021-02-02 RX ORDER — DEXAMETHASONE SODIUM PHOSPHATE 4 MG/ML
8 INJECTION, SOLUTION INTRA-ARTICULAR; INTRALESIONAL; INTRAMUSCULAR; INTRAVENOUS; SOFT TISSUE
Status: COMPLETED | OUTPATIENT
Start: 2021-02-02 | End: 2021-02-02

## 2021-02-02 RX ORDER — KETOROLAC TROMETHAMINE 10 MG/1
10 TABLET, FILM COATED ORAL
Status: COMPLETED | OUTPATIENT
Start: 2021-02-02 | End: 2021-02-02

## 2021-02-02 RX ADMIN — DEXAMETHASONE SODIUM PHOSPHATE 8 MG: 4 INJECTION INTRA-ARTICULAR; INTRALESIONAL; INTRAMUSCULAR; INTRAVENOUS; SOFT TISSUE at 03:02

## 2021-02-02 RX ADMIN — KETOROLAC TROMETHAMINE 10 MG: 10 TABLET, FILM COATED ORAL at 03:02

## 2021-04-26 ENCOUNTER — PATIENT MESSAGE (OUTPATIENT)
Dept: RESEARCH | Facility: HOSPITAL | Age: 36
End: 2021-04-26

## 2023-02-21 ENCOUNTER — HOSPITAL ENCOUNTER (EMERGENCY)
Facility: HOSPITAL | Age: 38
Discharge: HOME | End: 2023-02-21
Attending: EMERGENCY MEDICINE
Payer: COMMERCIAL

## 2023-02-21 VITALS
DIASTOLIC BLOOD PRESSURE: 75 MMHG | TEMPERATURE: 98 F | OXYGEN SATURATION: 95 % | WEIGHT: 250 LBS | SYSTOLIC BLOOD PRESSURE: 131 MMHG | HEIGHT: 71 IN | RESPIRATION RATE: 16 BRPM | BODY MASS INDEX: 35 KG/M2 | HEART RATE: 94 BPM

## 2023-02-21 DIAGNOSIS — R10.9 LEFT FLANK PAIN: Primary | ICD-10-CM

## 2023-02-21 LAB
ALBUMIN SERPL BCP-MCNC: 3.9 G/DL (ref 3.5–5.2)
ALP SERPL-CCNC: 48 U/L (ref 55–135)
ALT SERPL W/O P-5'-P-CCNC: 23 U/L (ref 10–44)
ANION GAP SERPL CALC-SCNC: 8 MMOL/L (ref 8–16)
AST SERPL-CCNC: 23 U/L (ref 10–40)
BASOPHILS # BLD AUTO: 0.03 K/UL (ref 0–0.2)
BASOPHILS NFR BLD: 0.3 % (ref 0–1.9)
BILIRUB SERPL-MCNC: 0.3 MG/DL (ref 0.1–1)
BILIRUB UR QL STRIP: NEGATIVE
BUN SERPL-MCNC: 11 MG/DL (ref 6–20)
CALCIUM SERPL-MCNC: 8.6 MG/DL (ref 8.7–10.5)
CHLORIDE SERPL-SCNC: 106 MMOL/L (ref 95–110)
CLARITY UR: CLEAR
CO2 SERPL-SCNC: 26 MMOL/L (ref 23–29)
COLOR UR: COLORLESS
CREAT SERPL-MCNC: 1 MG/DL (ref 0.5–1.4)
DIFFERENTIAL METHOD: ABNORMAL
EOSINOPHIL # BLD AUTO: 0.1 K/UL (ref 0–0.5)
EOSINOPHIL NFR BLD: 1.4 % (ref 0–8)
ERYTHROCYTE [DISTWIDTH] IN BLOOD BY AUTOMATED COUNT: 12.2 % (ref 11.5–14.5)
EST. GFR  (NO RACE VARIABLE): >60 ML/MIN/1.73 M^2
GLUCOSE SERPL-MCNC: 106 MG/DL (ref 70–110)
GLUCOSE UR QL STRIP: NEGATIVE
HCT VFR BLD AUTO: 41.2 % (ref 40–54)
HGB BLD-MCNC: 14.2 G/DL (ref 14–18)
HGB UR QL STRIP: NEGATIVE
IMM GRANULOCYTES # BLD AUTO: 0.02 K/UL (ref 0–0.04)
IMM GRANULOCYTES NFR BLD AUTO: 0.2 % (ref 0–0.5)
KETONES UR QL STRIP: NEGATIVE
LEUKOCYTE ESTERASE UR QL STRIP: NEGATIVE
LIPASE SERPL-CCNC: 14 U/L (ref 4–60)
LYMPHOCYTES # BLD AUTO: 1.3 K/UL (ref 1–4.8)
LYMPHOCYTES NFR BLD: 13.1 % (ref 18–48)
MCH RBC QN AUTO: 30.5 PG (ref 27–31)
MCHC RBC AUTO-ENTMCNC: 34.5 G/DL (ref 32–36)
MCV RBC AUTO: 89 FL (ref 82–98)
MONOCYTES # BLD AUTO: 0.5 K/UL (ref 0.3–1)
MONOCYTES NFR BLD: 5.2 % (ref 4–15)
NEUTROPHILS # BLD AUTO: 7.9 K/UL (ref 1.8–7.7)
NEUTROPHILS NFR BLD: 79.8 % (ref 38–73)
NITRITE UR QL STRIP: NEGATIVE
NRBC BLD-RTO: 0 /100 WBC
PH UR STRIP: 7 [PH] (ref 5–8)
PLATELET # BLD AUTO: 260 K/UL (ref 150–450)
PMV BLD AUTO: 10.1 FL (ref 9.2–12.9)
POTASSIUM SERPL-SCNC: 4.3 MMOL/L (ref 3.5–5.1)
PROT SERPL-MCNC: 7.3 G/DL (ref 6–8.4)
PROT UR QL STRIP: NEGATIVE
RBC # BLD AUTO: 4.65 M/UL (ref 4.6–6.2)
SODIUM SERPL-SCNC: 140 MMOL/L (ref 136–145)
SP GR UR STRIP: 1 (ref 1–1.03)
URN SPEC COLLECT METH UR: ABNORMAL
UROBILINOGEN UR STRIP-ACNC: NEGATIVE EU/DL
WBC # BLD AUTO: 9.85 K/UL (ref 3.9–12.7)

## 2023-02-21 PROCEDURE — 25000003 PHARM REV CODE 250: Performed by: EMERGENCY MEDICINE

## 2023-02-21 PROCEDURE — 96374 THER/PROPH/DIAG INJ IV PUSH: CPT

## 2023-02-21 PROCEDURE — 96361 HYDRATE IV INFUSION ADD-ON: CPT

## 2023-02-21 PROCEDURE — 83690 ASSAY OF LIPASE: CPT | Performed by: EMERGENCY MEDICINE

## 2023-02-21 PROCEDURE — 81003 URINALYSIS AUTO W/O SCOPE: CPT | Performed by: EMERGENCY MEDICINE

## 2023-02-21 PROCEDURE — 63600175 PHARM REV CODE 636 W HCPCS: Performed by: EMERGENCY MEDICINE

## 2023-02-21 PROCEDURE — 85025 COMPLETE CBC W/AUTO DIFF WBC: CPT | Performed by: EMERGENCY MEDICINE

## 2023-02-21 PROCEDURE — 96376 TX/PRO/DX INJ SAME DRUG ADON: CPT

## 2023-02-21 PROCEDURE — 96375 TX/PRO/DX INJ NEW DRUG ADDON: CPT

## 2023-02-21 PROCEDURE — 99285 EMERGENCY DEPT VISIT HI MDM: CPT | Mod: 25

## 2023-02-21 PROCEDURE — 80053 COMPREHEN METABOLIC PANEL: CPT | Performed by: EMERGENCY MEDICINE

## 2023-02-21 RX ORDER — KETOROLAC TROMETHAMINE 30 MG/ML
15 INJECTION, SOLUTION INTRAMUSCULAR; INTRAVENOUS
Status: COMPLETED | OUTPATIENT
Start: 2023-02-21 | End: 2023-02-21

## 2023-02-21 RX ORDER — NAPROXEN 500 MG/1
500 TABLET ORAL 2 TIMES DAILY PRN
Qty: 20 TABLET | Refills: 0 | Status: SHIPPED | OUTPATIENT
Start: 2023-02-21 | End: 2023-03-03

## 2023-02-21 RX ORDER — MORPHINE SULFATE 4 MG/ML
4 INJECTION, SOLUTION INTRAMUSCULAR; INTRAVENOUS
Status: COMPLETED | OUTPATIENT
Start: 2023-02-21 | End: 2023-02-21

## 2023-02-21 RX ORDER — LIDOCAINE 50 MG/G
1 PATCH TOPICAL
Status: DISCONTINUED | OUTPATIENT
Start: 2023-02-21 | End: 2023-02-21 | Stop reason: HOSPADM

## 2023-02-21 RX ADMIN — KETOROLAC TROMETHAMINE 15 MG: 30 INJECTION, SOLUTION INTRAMUSCULAR; INTRAVENOUS at 02:02

## 2023-02-21 RX ADMIN — MORPHINE SULFATE 4 MG: 4 INJECTION INTRAVENOUS at 02:02

## 2023-02-21 RX ADMIN — LIDOCAINE 1 PATCH: 50 PATCH TOPICAL at 04:02

## 2023-02-21 RX ADMIN — MORPHINE SULFATE 4 MG: 4 INJECTION INTRAVENOUS at 04:02

## 2023-02-21 RX ADMIN — SODIUM CHLORIDE 1000 ML: 9 INJECTION, SOLUTION INTRAVENOUS at 02:02

## 2023-02-21 NOTE — DISCHARGE INSTRUCTIONS
Follow-up with primary care provider for further management and re-evaluation of symptoms.  Recommend re-evaluation within 3-5 days.  Seek medical care if symptoms worsen or concerns.

## 2023-02-21 NOTE — ED PROVIDER NOTES
"Encounter Date: 2/21/2023    SCRIBE #1 NOTE: I, Lina Crystal, am scribing for, and in the presence of,  Timothy Romo MD.     History     Chief Complaint   Patient presents with    Flank Pain     Pt reports left flank pain x 1 week with increasing pain tonight. Reports nausea without vomiting. No hx of kidney stones.      38 yo M presents to the ED with back and flank pain. His past medical history is significant for stomach ulcers, gastritis. He started experiencing intermittent left sided pain with radiation to his left flank and left mid back around 1 week ago. He also reports associated abdominal bloating and "pressure" pain. States he has been taking Omeprazole for attempted Tx. Denies any worsening pain with defecating or urinating. Denies radiation of pain to his groin. He started experiencing acute dyspnea around 12:30 AM today, prompting his concern to the ED. Denies chest pain, cough, nausea, vomiting, diarrhea, melena, hematochezia, frequency, dysuria, groin or testicular pain, or other associated symptoms. No personal h/o kidney stones. No prior abdominal surgeries.     The history is provided by the patient.   Review of patient's allergies indicates:   Allergen Reactions    Mucinex [guaifenesin] Hives    Tylenol [acetaminophen] Hives    Amoxicillin Rash     Past Medical History:   Diagnosis Date    Neck injury     Stomach ulcer     Tailbone injury      Past Surgical History:   Procedure Laterality Date    SPINE SURGERY       History reviewed. No pertinent family history.  Social History     Tobacco Use    Smoking status: Never    Smokeless tobacco: Never   Substance Use Topics    Alcohol use: Yes     Comment: Social    Drug use: No     Review of Systems   Constitutional:  Negative for chills and fever.   HENT:  Negative for congestion.    Respiratory:  Positive for shortness of breath. Negative for cough.    Cardiovascular:  Negative for chest pain.   Gastrointestinal:  Positive for abdominal " distention and abdominal pain. Negative for diarrhea and nausea.   Genitourinary:  Positive for flank pain. Negative for dysuria, frequency and testicular pain.   Musculoskeletal:  Positive for back pain.   Skin:  Negative for rash.   Neurological:  Negative for headaches.     Physical Exam     Initial Vitals [02/21/23 0216]   BP Pulse Resp Temp SpO2   (!) 157/100 (!) 112 20 97.7 °F (36.5 °C) 98 %      MAP       --         Physical Exam    Nursing note and vitals reviewed.  Constitutional: He appears well-developed. He is not diaphoretic. No distress.   HENT:   Head: Normocephalic.   Eyes: EOM are normal.   Cardiovascular:  Regular rhythm.   Tachycardia present.         No murmur heard.  Pulmonary/Chest: Effort normal and breath sounds normal. He has no wheezes.   Abdominal: Abdomen is soft. He exhibits no distension. There is no abdominal tenderness.   No right CVA tenderness.  No left CVA tenderness.   Musculoskeletal:         General: No tenderness or edema. Normal range of motion.     Neurological: He is alert.   Skin: Skin is warm.       ED Course   Procedures  Labs Reviewed   CBC W/ AUTO DIFFERENTIAL - Abnormal; Notable for the following components:       Result Value    Gran # (ANC) 7.9 (*)     Gran % 79.8 (*)     Lymph % 13.1 (*)     All other components within normal limits   COMPREHENSIVE METABOLIC PANEL - Abnormal; Notable for the following components:    Calcium 8.6 (*)     Alkaline Phosphatase 48 (*)     All other components within normal limits   URINALYSIS, REFLEX TO URINE CULTURE - Abnormal; Notable for the following components:    Color, UA Colorless (*)     All other components within normal limits    Narrative:     Specimen Source->Urine   LIPASE          Imaging Results              CT Abdomen Pelvis  Without Contrast (In process)                      Medications   sodium chloride 0.9% bolus 1,000 mL 1,000 mL (0 mLs Intravenous Stopped 2/21/23 0405)   morphine injection 4 mg (4 mg Intravenous  Given 2/21/23 0252)   ketorolac injection 15 mg (15 mg Intravenous Given 2/21/23 0250)   morphine injection 4 mg (4 mg Intravenous Given 2/21/23 0405)     Medical Decision Making:   History:   Old Medical Records: I decided to obtain old medical records.  Initial Assessment:     37-year-old male presenting with left flank pain.  Atraumatic pain.  No overlying skin rash.  Denies any urinary symptoms.  No hematuria.  No urinary tract infection on UA.  No hydronephrosis on CT imaging or nephrolithiasis.  Unclear source of pain at this time.  Patient's pain did improve.  No abdominal tenderness on palpation.  Recommendation is to follow up with primary care for re-evaluation and further management.  Discussed using NSAIDs for pain control.  Differential Diagnosis:   Nephrolithiasis, lower respiratory tract infection, shingles  Clinical Tests:   Lab Tests: Ordered and Reviewed  Radiological Study: Ordered and Reviewed  ED Management:    Problems considered includes left flank pain (Signs & symptoms, differentials)  Imaging independently reviewed.  Agree with radiologist's interpretation. Imaging includes no left lower lobe infiltrate.  No hydronephrosis or nephrolithiasis on CT imaging.  All labs reviewed and considered in the patient's differential diagnosis. Discussion of labs includes no hematuria on UA.  No leukocytosis to suggest SIRS.      Plan was discussed with the patient.  This includes plan for follow up, NSAIDs for analgesia, labs, imaging results.    All questions or concerns have been addressed.        Scribe Attestation:   Scribe #1: I performed the above scribed service and the documentation accurately describes the services I performed. I attest to the accuracy of the note.      ED Course as of 02/21/23 0731   Tue Feb 21, 2023   0424 CT Abdomen Pelvis  Without Contrast  [facility]    IMPRESSION:  Colonic diverticulosis without CT evidence of diverticulitis. [JM]      ED Course User Index  [JM] Timothy Romo,  MD                 Clinical Impression:   Final diagnoses:  [R10.9] Left flank pain (Primary)        ED Disposition Condition    Discharge Stable        I, Timothy Romo, personally performed the services described in this documentation. All medical record entries made by the scribe were at my direction and in my presence. I have reviewed the chart and agree that the record reflects my personal performance and is accurate and complete.    ED Prescriptions       Medication Sig Dispense Start Date End Date Auth. Provider    naproxen (NAPROSYN) 500 MG tablet Take 1 tablet (500 mg total) by mouth 2 (two) times daily as needed (pain). 20 tablet 2/21/2023 3/3/2023 Timothy Romo MD          Follow-up Information       Follow up With Specialties Details Why Contact Info    Valentin Chavez MD Family Medicine In 2 days Primary care [address] [place] [address]  [phone]      [facility]  If symptoms worsen [address]  [facility] [address] [phone]             Timothy Romo MD  02/21/23 0750

## 2023-03-05 ENCOUNTER — HOSPITAL ENCOUNTER (EMERGENCY)
Facility: HOSPITAL | Age: 38
Discharge: HOME | End: 2023-03-05
Attending: EMERGENCY MEDICINE
Payer: COMMERCIAL

## 2023-03-05 VITALS
TEMPERATURE: 98 F | WEIGHT: 255 LBS | HEIGHT: 71 IN | DIASTOLIC BLOOD PRESSURE: 78 MMHG | BODY MASS INDEX: 35.7 KG/M2 | OXYGEN SATURATION: 99 % | SYSTOLIC BLOOD PRESSURE: 135 MMHG | RESPIRATION RATE: 16 BRPM | HEART RATE: 82 BPM

## 2023-03-05 DIAGNOSIS — R10.9 FLANK PAIN: Primary | ICD-10-CM

## 2023-03-05 LAB
ALBUMIN SERPL BCP-MCNC: 4.7 G/DL (ref 3.5–5.2)
ALP SERPL-CCNC: 51 U/L (ref 55–135)
ALT SERPL W/O P-5'-P-CCNC: 22 U/L (ref 10–44)
ANION GAP SERPL CALC-SCNC: 16 MMOL/L (ref 8–16)
AST SERPL-CCNC: 18 U/L (ref 10–40)
BASOPHILS # BLD AUTO: 0.05 K/UL (ref 0–0.2)
BASOPHILS NFR BLD: 0.5 % (ref 0–1.9)
BILIRUB SERPL-MCNC: 0.3 MG/DL (ref 0.1–1)
BILIRUB UR QL STRIP: NEGATIVE
BUN SERPL-MCNC: 13 MG/DL (ref 6–20)
CALCIUM SERPL-MCNC: 10.1 MG/DL (ref 8.7–10.5)
CHLORIDE SERPL-SCNC: 103 MMOL/L (ref 95–110)
CLARITY UR REFRACT.AUTO: CLEAR
CO2 SERPL-SCNC: 21 MMOL/L (ref 23–29)
COLOR UR AUTO: YELLOW
CREAT SERPL-MCNC: 0.9 MG/DL (ref 0.5–1.4)
DIFFERENTIAL METHOD: NORMAL
EOSINOPHIL # BLD AUTO: 0.1 K/UL (ref 0–0.5)
EOSINOPHIL NFR BLD: 0.8 % (ref 0–8)
ERYTHROCYTE [DISTWIDTH] IN BLOOD BY AUTOMATED COUNT: 12.2 % (ref 11.5–14.5)
EST. GFR  (NO RACE VARIABLE): >60 ML/MIN/1.73 M^2
GLUCOSE SERPL-MCNC: 90 MG/DL (ref 70–110)
GLUCOSE UR QL STRIP: NEGATIVE
HCT VFR BLD AUTO: 47.4 % (ref 40–54)
HCV AB SERPL QL IA: NORMAL
HGB BLD-MCNC: 16.2 G/DL (ref 14–18)
HGB UR QL STRIP: NEGATIVE
HIV 1+2 AB+HIV1 P24 AG SERPL QL IA: NORMAL
IMM GRANULOCYTES # BLD AUTO: 0.03 K/UL (ref 0–0.04)
IMM GRANULOCYTES NFR BLD AUTO: 0.3 % (ref 0–0.5)
INFLUENZA A, MOLECULAR: NOT DETECTED
INFLUENZA B, MOLECULAR: NOT DETECTED
KETONES UR QL STRIP: NEGATIVE
LEUKOCYTE ESTERASE UR QL STRIP: NEGATIVE
LIPASE SERPL-CCNC: 10 U/L (ref 4–60)
LYMPHOCYTES # BLD AUTO: 2.7 K/UL (ref 1–4.8)
LYMPHOCYTES NFR BLD: 28.8 % (ref 18–48)
MCH RBC QN AUTO: 30.9 PG (ref 27–31)
MCHC RBC AUTO-ENTMCNC: 34.2 G/DL (ref 32–36)
MCV RBC AUTO: 90 FL (ref 82–98)
MONOCYTES # BLD AUTO: 0.5 K/UL (ref 0.3–1)
MONOCYTES NFR BLD: 5.7 % (ref 4–15)
NEUTROPHILS # BLD AUTO: 6.1 K/UL (ref 1.8–7.7)
NEUTROPHILS NFR BLD: 63.9 % (ref 38–73)
NITRITE UR QL STRIP: NEGATIVE
NRBC BLD-RTO: 0 /100 WBC
PH UR STRIP: 5 [PH] (ref 5–8)
PLATELET # BLD AUTO: 294 K/UL (ref 150–450)
PMV BLD AUTO: 9.9 FL (ref 9.2–12.9)
POTASSIUM SERPL-SCNC: 3.8 MMOL/L (ref 3.5–5.1)
PROT SERPL-MCNC: 8.4 G/DL (ref 6–8.4)
PROT UR QL STRIP: NEGATIVE
RBC # BLD AUTO: 5.25 M/UL (ref 4.6–6.2)
RSV AG BY MOLECULAR METHOD: NOT DETECTED
SARS-COV-2 RNA RESP QL NAA+PROBE: NOT DETECTED
SODIUM SERPL-SCNC: 140 MMOL/L (ref 136–145)
SP GR UR STRIP: 1.01 (ref 1–1.03)
TROPONIN I SERPL DL<=0.01 NG/ML-MCNC: <0.006 NG/ML (ref 0–0.03)
URN SPEC COLLECT METH UR: NORMAL
WBC # BLD AUTO: 9.52 K/UL (ref 3.9–12.7)

## 2023-03-05 PROCEDURE — 63600175 PHARM REV CODE 636 W HCPCS: Performed by: EMERGENCY MEDICINE

## 2023-03-05 PROCEDURE — 86803 HEPATITIS C AB TEST: CPT | Performed by: PHYSICIAN ASSISTANT

## 2023-03-05 PROCEDURE — 80053 COMPREHEN METABOLIC PANEL: CPT | Performed by: EMERGENCY MEDICINE

## 2023-03-05 PROCEDURE — 83690 ASSAY OF LIPASE: CPT | Performed by: EMERGENCY MEDICINE

## 2023-03-05 PROCEDURE — 99285 EMERGENCY DEPT VISIT HI MDM: CPT | Mod: 25

## 2023-03-05 PROCEDURE — 0241U SARS-COV2 (COVID) WITH FLU/RSV BY PCR: CPT | Performed by: EMERGENCY MEDICINE

## 2023-03-05 PROCEDURE — 25000003 PHARM REV CODE 250: Performed by: EMERGENCY MEDICINE

## 2023-03-05 PROCEDURE — 99284 PR EMERGENCY DEPT VISIT,LEVEL IV: ICD-10-PCS | Mod: CS,,, | Performed by: EMERGENCY MEDICINE

## 2023-03-05 PROCEDURE — 87389 HIV-1 AG W/HIV-1&-2 AB AG IA: CPT | Performed by: PHYSICIAN ASSISTANT

## 2023-03-05 PROCEDURE — 99284 EMERGENCY DEPT VISIT MOD MDM: CPT | Mod: CS,,, | Performed by: EMERGENCY MEDICINE

## 2023-03-05 PROCEDURE — 96374 THER/PROPH/DIAG INJ IV PUSH: CPT

## 2023-03-05 PROCEDURE — 84484 ASSAY OF TROPONIN QUANT: CPT | Performed by: EMERGENCY MEDICINE

## 2023-03-05 PROCEDURE — 81003 URINALYSIS AUTO W/O SCOPE: CPT | Performed by: EMERGENCY MEDICINE

## 2023-03-05 PROCEDURE — 25500020 PHARM REV CODE 255: Performed by: EMERGENCY MEDICINE

## 2023-03-05 PROCEDURE — 85025 COMPLETE CBC W/AUTO DIFF WBC: CPT | Performed by: EMERGENCY MEDICINE

## 2023-03-05 RX ORDER — OXYCODONE HYDROCHLORIDE 5 MG/1
5 TABLET ORAL
Status: COMPLETED | OUTPATIENT
Start: 2023-03-05 | End: 2023-03-05

## 2023-03-05 RX ORDER — CYCLOBENZAPRINE HCL 10 MG
10 TABLET ORAL
Status: COMPLETED | OUTPATIENT
Start: 2023-03-05 | End: 2023-03-05

## 2023-03-05 RX ORDER — MORPHINE SULFATE 4 MG/ML
4 INJECTION, SOLUTION INTRAMUSCULAR; INTRAVENOUS
Status: COMPLETED | OUTPATIENT
Start: 2023-03-05 | End: 2023-03-05

## 2023-03-05 RX ADMIN — CYCLOBENZAPRINE 10 MG: 10 TABLET, FILM COATED ORAL at 11:03

## 2023-03-05 RX ADMIN — IOHEXOL 100 ML: 350 INJECTION, SOLUTION INTRAVENOUS at 08:03

## 2023-03-05 RX ADMIN — MORPHINE SULFATE 4 MG: 4 INJECTION INTRAVENOUS at 08:03

## 2023-03-05 RX ADMIN — OXYCODONE 5 MG: 5 TABLET ORAL at 11:03

## 2023-03-06 NOTE — DISCHARGE INSTRUCTIONS
As I explained, your imaging was negative for any acute abnormality.  This pain could be muscular in origin.  Take Flexeril every 8 hours as needed for spasm.  Take anti-inflammatories as prescribed.  Given your history of stomach ulcers, be sure to take it with omeprazole as planned.  Be sure to follow-up with your primary care physician.  Return to the ER for any worsening symptoms.

## 2023-03-06 NOTE — ED TRIAGE NOTES
Heri Gordillo, a 37 y.o. male presents to the ED w/ complaint of bilateral flank pain. Pt reports the L side started about 3 weeks ago. Pt states the right started today. Pt was started on augmentin and flexeril on Friday.   Triage note:  Chief Complaint   Patient presents with    Flank Pain     Bilateral flank pain that started on the left side and radiated to the right. Denies N/V/D.     Review of patient's allergies indicates:   Allergen Reactions    Mucinex [guaifenesin] Hives    Tylenol [acetaminophen] Hives    Amoxicillin Rash     Past Medical History:   Diagnosis Date    Neck injury     Stomach ulcer     Tailbone injury

## 2023-03-06 NOTE — ED PROVIDER NOTES
Encounter Date: 3/5/2023    SCRIBE #1 NOTE: I, Yun Bardales, am scribing for, and in the presence of,  Jacoby Gardner MD. I have scribed the following portions of the note - Other sections scribed: TAD BALES.     History     Chief Complaint   Patient presents with    Flank Pain     Bilateral flank pain that started on the left side and radiated to the right. Denies N/V/D.     36 yo M with pmhx neck fracture status post surgery, stomach ulcer, gastritis presents with a chief complaint of flank pain.  History is assisted by the patient's friend.  He is had pain in the left flank intermittently for the past 3 weeks.  He had an ED encounter on February 21st at which time he had a CT abdomen/pelvis noncontrast that revealed diverticulosis without any evidence of diverticulosis.  He since followed up with his primary care physician given persistent symptoms.  She eventually prescribed a course of cyclobenzaprine in case it was muscular and initiated Augmentin should this have progressed into diverticulitis.  He did report some improvement with the Flexeril temporarily but symptoms have persisted.  He reports occasional shortness of breath with exertion and had an episode in the waiting room as well.  No immobilization or recent travel.  No cough, chest pain.  No nausea, vomiting, diarrhea.  No dysuria, urinary frequency.  Today when pain was more severe it seemed to migrate towards the right flank.      Review of patient's allergies indicates:   Allergen Reactions    Mucinex [guaifenesin] Hives    Tylenol [acetaminophen] Hives    Amoxicillin Rash     Past Medical History:   Diagnosis Date    Neck injury     Stomach ulcer     Tailbone injury      Past Surgical History:   Procedure Laterality Date    SPINE SURGERY       History reviewed. No pertinent family history.  Social History     Tobacco Use    Smoking status: Never    Smokeless tobacco: Never   Substance Use Topics    Alcohol use: Yes     Comment: Social    Drug use:  No     Review of Systems   Respiratory:  Positive for shortness of breath. Negative for cough.    Gastrointestinal:  Positive for abdominal pain and diarrhea. Negative for constipation, nausea and vomiting.   Genitourinary:  Positive for flank pain (left). Negative for dysuria, frequency and hematuria.   Neurological:         +right leg tingling sensation     Physical Exam     Initial Vitals [03/05/23 1715]   BP Pulse Resp Temp SpO2   (!) 144/61 93 16 98.1 °F (36.7 °C) 98 %      MAP       --         Physical Exam    Nursing note and vitals reviewed.  Constitutional: He appears well-developed and well-nourished. He is not diaphoretic. No distress.   HENT:   Head: Normocephalic and atraumatic.   Eyes: Right eye exhibits no discharge. Left eye exhibits no discharge. No scleral icterus.   Neck: Neck supple. No JVD present.   Remote well healed surgical incision.    Normal range of motion.  Cardiovascular:  Regular rhythm and normal heart sounds.   Tachycardia present.   Exam reveals no gallop and no friction rub.       No murmur heard.  Pulmonary/Chest: Breath sounds normal. No respiratory distress. He has no wheezes. He has no rhonchi. He has no rales.   Abdominal: Abdomen is soft. He exhibits no distension and no mass. There is abdominal tenderness (LUQ > epigastric, RUQ) in the left upper quadrant.   Tenderness to palpation along left flank.  There is no rebound, no guarding and negative Watson's sign.   Musculoskeletal:         General: No tenderness. Normal range of motion.      Cervical back: Normal range of motion and neck supple. No tenderness.      Thoracic back: No tenderness.      Lumbar back: No tenderness.      Comments: No CVA tenderness with percussion.      Neurological: He is alert and oriented to person, place, and time. He has normal strength. No sensory deficit.   Skin: Skin is warm and dry. Capillary refill takes less than 2 seconds.   Psychiatric: He has a normal mood and affect.       ED Course    Procedures  Labs Reviewed   COMPREHENSIVE METABOLIC PANEL - Abnormal; Notable for the following components:       Result Value    CO2 21 (*)     Alkaline Phosphatase 51 (*)     All other components within normal limits   HIV 1 / 2 ANTIBODY    Narrative:     Release to patient->Immediate   HEPATITIS C ANTIBODY    Narrative:     Release to patient->Immediate   SARS-COV2 (COVID) WITH FLU/RSV BY PCR   CBC W/ AUTO DIFFERENTIAL   LIPASE   URINALYSIS, REFLEX TO URINE CULTURE    Narrative:     Specimen Source->Urine   TROPONIN I          Imaging Results              CTA Chest Non-Coronary (PE Studies) (Final result)  Result time 03/05/23 21:51:48      Final result by David Mcdonald MD (03/05/23 21:51:48)                   Impression:      No evidence of acute pulmonary embolus, allowing for motion limitations.  Further evaluation/follow-up if there is persistent strong clinical concern.    No acute abnormality identified in the abdomen or pelvis.    Colonic diverticulosis.    Incidental findings discussed in the body of the report.      Electronically signed by: David Mcdonald MD  Date:    03/05/2023  Time:    21:51               Narrative:    EXAMINATION:  CTA CHEST NON CORONARY (PE STUDIES); CT ABDOMEN PELVIS WITH CONTRAST    CLINICAL HISTORY:  Pulmonary embolism (PE) suspected, high prob;; Abdominal pain, acute, nonlocalized;    TECHNIQUE:  Low dose axial images, sagittal and coronal reformations were obtained from the thoracic inlet to the pubic symphysis following the IV administration of 100 mL of Omnipaque 350 .  Oral contrast was not given. Images through the chest were obtained utilizing CTA PE protocol with multiplanar and MIP reformats.  Images through the abdomen and pelvis obtained as a separate acquisition in delayed portal venous phase with multiplanar reformats.    COMPARISON:  CT abdomen pelvis without contrast, 02/21/2023.    FINDINGS:  Chest:    Base of the neck is negative for acute  finding.    Thoracic aorta is normal in caliber without evidence of aneurysm or dissection.  Evaluation of the pulmonary arteries is limited by motion, particularly in the lung bases.  No large or central pulmonary embolus identified.  No convincing pulmonary embolus to the proximal segmental level allowing for significant motion.    Heart is not significantly enlarged.  No abnormal bowing of the interventricular septum.  No pericardial effusion.    Evaluation of the lungs is limited by CTA technique and motion.  Right hemidiaphragm is elevated.  There is dependent subsegmental atelectasis.  No consolidation.  No pleural effusion.  No pneumothorax.    No bulky lymphadenopathy identified in the chest.  Patulous appearance of the esophagus.    Abdomen:    Exam quality is limited by motion.    Liver is normal in size and contour.  No worrisome hepatic mass.  Gallbladder is unremarkable. No intrahepatic biliary ductal dilatation.    Spleen is not significantly enlarged.  Adrenal glands and pancreas are unremarkable.    The kidneys are symmetric.  No hydronephrosis. No asymmetric perinephric inflammatory fat stranding.    No small bowel obstruction.  Appendix is normal and extends into the infrahepatic region of the right upper quadrant.  Scattered colonic diverticula without convincing findings of acute diverticulitis allowing for motion.    No pneumoperitoneum or organized fluid collection.    No bulky lymphadenopathy.    Abdominal aorta is normal in caliber.    Portal, splenic, and superior mesenteric veins are patent.    Pelvis:    Urinary bladder, pelvic organs, and rectum are unremarkable.  No free fluid in the pelvis.  No pelvic lymphadenopathy.    Bones and soft tissues:    No aggressive osseous lesions.  Extraperitoneal soft tissues are negative for acute finding.                                       CT Abdomen Pelvis With Contrast (Final result)  Result time 03/05/23 21:51:48      Final result by David BALTAZAR  MD Tamara (03/05/23 21:51:48)                   Impression:      No evidence of acute pulmonary embolus, allowing for motion limitations.  Further evaluation/follow-up if there is persistent strong clinical concern.    No acute abnormality identified in the abdomen or pelvis.    Colonic diverticulosis.    Incidental findings discussed in the body of the report.      Electronically signed by: David Mcdonald MD  Date:    03/05/2023  Time:    21:51               Narrative:    EXAMINATION:  CTA CHEST NON CORONARY (PE STUDIES); CT ABDOMEN PELVIS WITH CONTRAST    CLINICAL HISTORY:  Pulmonary embolism (PE) suspected, high prob;; Abdominal pain, acute, nonlocalized;    TECHNIQUE:  Low dose axial images, sagittal and coronal reformations were obtained from the thoracic inlet to the pubic symphysis following the IV administration of 100 mL of Omnipaque 350 .  Oral contrast was not given. Images through the chest were obtained utilizing CTA PE protocol with multiplanar and MIP reformats.  Images through the abdomen and pelvis obtained as a separate acquisition in delayed portal venous phase with multiplanar reformats.    COMPARISON:  CT abdomen pelvis without contrast, 02/21/2023.    FINDINGS:  Chest:    Base of the neck is negative for acute finding.    Thoracic aorta is normal in caliber without evidence of aneurysm or dissection.  Evaluation of the pulmonary arteries is limited by motion, particularly in the lung bases.  No large or central pulmonary embolus identified.  No convincing pulmonary embolus to the proximal segmental level allowing for significant motion.    Heart is not significantly enlarged.  No abnormal bowing of the interventricular septum.  No pericardial effusion.    Evaluation of the lungs is limited by CTA technique and motion.  Right hemidiaphragm is elevated.  There is dependent subsegmental atelectasis.  No consolidation.  No pleural effusion.  No pneumothorax.    No bulky lymphadenopathy  identified in the chest.  Patulous appearance of the esophagus.    Abdomen:    Exam quality is limited by motion.    Liver is normal in size and contour.  No worrisome hepatic mass.  Gallbladder is unremarkable. No intrahepatic biliary ductal dilatation.    Spleen is not significantly enlarged.  Adrenal glands and pancreas are unremarkable.    The kidneys are symmetric.  No hydronephrosis. No asymmetric perinephric inflammatory fat stranding.    No small bowel obstruction.  Appendix is normal and extends into the infrahepatic region of the right upper quadrant.  Scattered colonic diverticula without convincing findings of acute diverticulitis allowing for motion.    No pneumoperitoneum or organized fluid collection.    No bulky lymphadenopathy.    Abdominal aorta is normal in caliber.    Portal, splenic, and superior mesenteric veins are patent.    Pelvis:    Urinary bladder, pelvic organs, and rectum are unremarkable.  No free fluid in the pelvis.  No pelvic lymphadenopathy.    Bones and soft tissues:    No aggressive osseous lesions.  Extraperitoneal soft tissues are negative for acute finding.                                       Medications   morphine injection 4 mg (4 mg Intravenous Given 3/5/23 2027)   iohexoL (OMNIPAQUE 350) injection 100 mL (100 mLs Intravenous Given 3/5/23 2059)   oxyCODONE immediate release tablet 5 mg (5 mg Oral Given 3/5/23 2308)   cyclobenzaprine tablet 10 mg (10 mg Oral Given 3/5/23 2308)     Medical Decision Making:   History:   Old Medical Records: I decided to obtain old medical records.  Initial Assessment:   38 yo M with pmhx neck fracture status post surgery, stomach ulcer, gastritis presents with a chief complaint of 3 weeks of left flank pain now radiating to the right flank and some shortness of breath.    Differential Diagnosis:   PE, ACS, gastritis, diverticulitis, pyelonephritis, nephrolithiasis, pancreatitis, cholelithiasis  Clinical Tests:   Lab Tests:  Ordered  Radiological Study: Ordered  Medical Tests: Ordered  ED Management:  Will administer morphine.  Will obtain labs, CTA chest and CT abdomen/pelvis.    Reassessment:  CBC without leukocytosis or anemia.  CMP normal.  Lipase normal.  Troponin normal.  Influenza, RSV, COVID negative.  UA negative for blood or infection.  CTs are limited by motion but no evidence of PE or another acute process in the chest, abdomen, pelvis.  On repeat assessment, patient appears more comfortable.  I explained that no emergent etiology of his symptoms was found today.  I do suspect a component could be muscular in origin.  He was encouraged to continue use of the Flexeril.  I recommended NSAIDs but he notes that he is a history of a stomach ulcer.  His girlfriend noted that he can take them if he takes Prilosec and he plans to do this at home.  Advised follow-up with PCP.  Extensive return precautions.        Scribe Attestation:   Scribe #1: I performed the above scribed service and the documentation accurately describes the services I performed. I attest to the accuracy of the note.    Attending Attestation:           Physician Attestation for Scribe:      Comments: I, Dr. Jacoby Gardner, personally performed the services described in this documentation. All medical record entries made by the scribe were at my direction and in my presence.  I have reviewed the chart and agree that the record reflects my personal performance and is accurate and complete. Jacoby Gardner MD.  11:11 PM 03/05/2023                       Clinical Impression:   Final diagnoses:  [R10.9] Flank pain (Primary)        ED Disposition Condition    Discharge Stable          ED Prescriptions    None       Follow-up Information       Follow up With Specialties Details Why Contact Info    Valentin Chavez MD Family Medicine Schedule an appointment as soon as possible for a visit   [address] [address]  [phone]      [facility] - Emergency Dept  Emergency Medicine  As needed, If symptoms worsen [address]  [facility] [id number]  [phone]             Jacoby Gardner MD  03/05/23 8861

## 2025-06-09 ENCOUNTER — HOSPITAL ENCOUNTER (EMERGENCY)
Facility: HOSPITAL | Age: 40
Discharge: HOME | End: 2025-06-09
Attending: EMERGENCY MEDICINE
Payer: COMMERCIAL

## 2025-06-09 VITALS
TEMPERATURE: 98 F | DIASTOLIC BLOOD PRESSURE: 79 MMHG | OXYGEN SATURATION: 96 % | SYSTOLIC BLOOD PRESSURE: 126 MMHG | HEART RATE: 79 BPM | RESPIRATION RATE: 18 BRPM

## 2025-06-09 DIAGNOSIS — M54.6 BILATERAL THORACIC BACK PAIN, UNSPECIFIED CHRONICITY: ICD-10-CM

## 2025-06-09 DIAGNOSIS — R07.9 CHEST PAIN: ICD-10-CM

## 2025-06-09 DIAGNOSIS — R10.9 ABDOMINAL PAIN: Primary | ICD-10-CM

## 2025-06-09 LAB
ABSOLUTE EOSINOPHIL (OHS): 0.17 K/UL
ABSOLUTE MONOCYTE (OHS): 0.63 K/UL (ref 0.3–1)
ABSOLUTE NEUTROPHIL COUNT (OHS): 5.99 K/UL (ref 1.8–7.7)
ALBUMIN SERPL BCP-MCNC: 4.2 G/DL (ref 3.5–5.2)
ALLENS TEST: NORMAL
ALP SERPL-CCNC: 56 UNIT/L (ref 40–150)
ALT SERPL W/O P-5'-P-CCNC: 35 UNIT/L (ref 10–44)
ANION GAP (OHS): 10 MMOL/L (ref 8–16)
ANION GAP SERPL CALC-SCNC: 16 MMOL/L (ref 8–16)
AST SERPL-CCNC: 22 UNIT/L (ref 11–45)
BASOPHILS # BLD AUTO: 0.03 K/UL
BASOPHILS NFR BLD AUTO: 0.3 %
BILIRUB SERPL-MCNC: 0.2 MG/DL (ref 0.1–1)
BILIRUB UR QL STRIP.AUTO: NEGATIVE
BUN SERPL-MCNC: 12 MG/DL (ref 6–20)
BUN SERPL-MCNC: 12 MG/DL (ref 6–30)
CALCIUM SERPL-MCNC: 9.5 MG/DL (ref 8.7–10.5)
CHLORIDE SERPL-SCNC: 105 MMOL/L (ref 95–110)
CHLORIDE SERPL-SCNC: 107 MMOL/L (ref 95–110)
CLARITY UR: CLEAR
CO2 SERPL-SCNC: 23 MMOL/L (ref 23–29)
COLOR UR AUTO: YELLOW
CREAT SERPL-MCNC: 1 MG/DL (ref 0.5–1.4)
CREAT SERPL-MCNC: 1.1 MG/DL (ref 0.5–1.4)
ERYTHROCYTE [DISTWIDTH] IN BLOOD BY AUTOMATED COUNT: 11.9 % (ref 11.5–14.5)
GFR SERPLBLD CREATININE-BSD FMLA CKD-EPI: >60 ML/MIN/1.73/M2
GLUCOSE SERPL-MCNC: 103 MG/DL (ref 70–110)
GLUCOSE SERPL-MCNC: 108 MG/DL (ref 70–110)
GLUCOSE UR QL STRIP: NEGATIVE
HCT VFR BLD AUTO: 46.8 % (ref 40–54)
HCT VFR BLD CALC: 47 %PCV (ref 36–54)
HGB BLD-MCNC: 15.8 GM/DL (ref 14–18)
HGB UR QL STRIP: NEGATIVE
HOLD SPECIMEN: NORMAL
IMM GRANULOCYTES # BLD AUTO: 0.02 K/UL (ref 0–0.04)
IMM GRANULOCYTES NFR BLD AUTO: 0.2 % (ref 0–0.5)
KETONES UR QL STRIP: NEGATIVE
LEUKOCYTE ESTERASE UR QL STRIP: NEGATIVE
LIPASE SERPL-CCNC: 22 U/L (ref 4–60)
LYMPHOCYTES # BLD AUTO: 2.52 K/UL (ref 1–4.8)
MCH RBC QN AUTO: 30.8 PG (ref 27–31)
MCHC RBC AUTO-ENTMCNC: 33.8 G/DL (ref 32–36)
MCV RBC AUTO: 91 FL (ref 82–98)
NITRITE UR QL STRIP: NEGATIVE
NUCLEATED RBC (/100WBC) (OHS): 0 /100 WBC
PH UR STRIP: 8 [PH]
PLATELET # BLD AUTO: 305 K/UL (ref 150–450)
PMV BLD AUTO: 9.9 FL (ref 9.2–12.9)
POC IONIZED CALCIUM: 1.26 MMOL/L (ref 1.06–1.42)
POC TCO2 (MEASURED): 26 MMOL/L (ref 23–29)
POTASSIUM BLD-SCNC: 4.1 MMOL/L (ref 3.5–5.1)
POTASSIUM SERPL-SCNC: 4.3 MMOL/L (ref 3.5–5.1)
PROT SERPL-MCNC: 8.2 GM/DL (ref 6–8.4)
PROT UR QL STRIP: NEGATIVE
RBC # BLD AUTO: 5.13 M/UL (ref 4.6–6.2)
RELATIVE EOSINOPHIL (OHS): 1.8 %
RELATIVE LYMPHOCYTE (OHS): 26.9 % (ref 18–48)
RELATIVE MONOCYTE (OHS): 6.7 % (ref 4–15)
RELATIVE NEUTROPHIL (OHS): 64.1 % (ref 38–73)
SAMPLE: NORMAL
SITE: NORMAL
SODIUM BLD-SCNC: 142 MMOL/L (ref 136–145)
SODIUM SERPL-SCNC: 140 MMOL/L (ref 136–145)
SP GR UR STRIP: 1.02
TROPONIN I SERPL DL<=0.01 NG/ML-MCNC: <0.006 NG/ML
UROBILINOGEN UR STRIP-ACNC: NEGATIVE EU/DL
WBC # BLD AUTO: 9.36 K/UL (ref 3.9–12.7)

## 2025-06-09 PROCEDURE — 82565 ASSAY OF CREATININE: CPT

## 2025-06-09 PROCEDURE — 84520 ASSAY OF UREA NITROGEN: CPT

## 2025-06-09 PROCEDURE — 25000003 PHARM REV CODE 250

## 2025-06-09 PROCEDURE — 84484 ASSAY OF TROPONIN QUANT: CPT

## 2025-06-09 PROCEDURE — 85025 COMPLETE CBC W/AUTO DIFF WBC: CPT

## 2025-06-09 PROCEDURE — 85014 HEMATOCRIT: CPT | Mod: 91

## 2025-06-09 PROCEDURE — 82330 ASSAY OF CALCIUM: CPT

## 2025-06-09 PROCEDURE — 81003 URINALYSIS AUTO W/O SCOPE: CPT

## 2025-06-09 PROCEDURE — 84132 ASSAY OF SERUM POTASSIUM: CPT | Mod: 59

## 2025-06-09 PROCEDURE — 82962 GLUCOSE BLOOD TEST: CPT

## 2025-06-09 PROCEDURE — 99900035 HC TECH TIME PER 15 MIN (STAT)

## 2025-06-09 PROCEDURE — 83690 ASSAY OF LIPASE: CPT

## 2025-06-09 PROCEDURE — 84295 ASSAY OF SERUM SODIUM: CPT

## 2025-06-09 PROCEDURE — 99285 EMERGENCY DEPT VISIT HI MDM: CPT | Mod: 25

## 2025-06-09 RX ORDER — ALUMINUM HYDROXIDE, MAGNESIUM HYDROXIDE, AND SIMETHICONE 1200; 120; 1200 MG/30ML; MG/30ML; MG/30ML
30 SUSPENSION ORAL ONCE
Status: COMPLETED | OUTPATIENT
Start: 2025-06-09 | End: 2025-06-09

## 2025-06-09 RX ORDER — IBUPROFEN 800 MG/1
800 TABLET, FILM COATED ORAL EVERY 6 HOURS PRN
Qty: 20 TABLET | Refills: 0 | Status: SHIPPED | OUTPATIENT
Start: 2025-06-09

## 2025-06-09 RX ORDER — PREDNISONE 20 MG/1
40 TABLET ORAL DAILY
Qty: 6 TABLET | Refills: 0 | Status: SHIPPED | OUTPATIENT
Start: 2025-06-09 | End: 2025-06-12

## 2025-06-09 RX ORDER — METHOCARBAMOL 500 MG/1
1000 TABLET, FILM COATED ORAL 3 TIMES DAILY
Qty: 30 TABLET | Refills: 0 | Status: SHIPPED | OUTPATIENT
Start: 2025-06-09 | End: 2025-06-14

## 2025-06-09 RX ORDER — FAMOTIDINE 20 MG/1
40 TABLET, FILM COATED ORAL
Status: COMPLETED | OUTPATIENT
Start: 2025-06-09 | End: 2025-06-09

## 2025-06-09 RX ADMIN — FAMOTIDINE 40 MG: 20 TABLET, FILM COATED ORAL at 04:06

## 2025-06-09 RX ADMIN — ALUMINUM HYDROXIDE, MAGNESIUM HYDROXIDE, AND SIMETHICONE 30 ML: 200; 200; 20 SUSPENSION ORAL at 04:06

## 2025-06-09 NOTE — ED TRIAGE NOTES
"Pt to the ED with complaints of upper middle back pain that "wraps around" to his RUQ x1 month. Pt states the pain is worse after eating. Pt also reports he sometimes gets out of breath while walking up stairs.   "

## 2025-06-09 NOTE — ED PROVIDER NOTES
Encounter Date: 6/9/2025    SCRIBE #1 NOTE: I, Simin Nava, am scribing for, and in the presence of,  Dakota Borjas PA-C. I have scribed the following portions of the note - Other sections scribed: HPI,ROS,.       History     Chief Complaint   Patient presents with    Abdominal Pain     Pt BIB EMS, c/o intermittent,RUQ abd pain x one week. Pt reports pain radiates to back of shoulders and states pain worsens after he eats. Pt denies any CP, SOB, or n/v/d     Heri Gordillo is a 39 y.o. male, with a PMHx of Neck injury, stomach ulcer, who presents to the ED with complaint of atraumatic thoracic back pain that radiates to bilateral flank that  began 1 month ago. Patient denies any recent heavy lifting or mechanical injury. He reports eating exacerbates the pain. Reports noticing a rash to his back and armpits, but states it has resolved. Further reports intermittent chest pain with onset of back pain. Patient reports [place]. Denies history of kidney stones or kidney problems. Attempted treatment with [facility] back and body medication and ibuprofen to little relief. . No other exacerbating or alleviating factors. Denies fever, chills, nausea, emesis, diarrhea, constipation, urinary frequency, urinary urgency, dysuria, hematuria, urinary/bowel incontinence, saddle anesthesia,  or other associated symptoms. Patient reports history of cervical spine surgery in 2007 for traumatic neck injury.       The history is provided by the patient. No  was used.     Review of patient's allergies indicates:   Allergen Reactions    Mucinex [guaifenesin] Hives    Tylenol [acetaminophen] Hives    Amoxicillin Rash     Past Medical History:   Diagnosis Date    Neck injury     Stomach ulcer     Tailbone injury      Past Surgical History:   Procedure Laterality Date    SPINE SURGERY       No family history on file.  Social History[1]  Review of Systems   Constitutional:  Negative for  diaphoresis, fatigue and unexpected weight change.   HENT:  Negative for congestion, sinus pain and sore throat.    Eyes:  Negative for visual disturbance.   Respiratory:  Negative for cough, chest tightness and shortness of breath.    Cardiovascular:  Positive for chest pain. Negative for palpitations.   Gastrointestinal:  Negative for abdominal pain, blood in stool, diarrhea, nausea and vomiting.   Genitourinary:  Negative for dysuria, frequency and urgency.        (-) urinary/bowel incontinence    Musculoskeletal:  Positive for back pain (radiates to bilateral flank region). Negative for arthralgias and myalgias.   Skin:  Positive for rash (to back; resolved).   Allergic/Immunologic: Negative for environmental allergies.   Neurological:  Negative for dizziness, syncope and headaches.        (-) saddle anesthesia    Psychiatric/Behavioral: Negative.  Negative for suicidal ideas.        Physical Exam     Initial Vitals   BP Pulse Resp Temp SpO2   06/09/25 1523 06/09/25 1523 06/09/25 1526 06/09/25 1523 06/09/25 1523   135/84 93 18 98 °F (36.7 °C) 96 %      MAP       --                Physical Exam    Nursing note and vitals reviewed.  Constitutional: Vital signs are normal. He appears well-developed and well-nourished. He is Obese . He is cooperative. He does not appear ill. No distress.   Well-appearing.  No acute distress.  Alert and interactive.   HENT:   Head: Normocephalic and atraumatic.   Right Ear: External ear normal.   Left Ear: External ear normal.   Nose: Nose normal.   Moist mucous membranes.   Eyes: Conjunctivae and EOM are normal.   Neck: Phonation normal.   Normal range of motion.  Cardiovascular:  Normal rate, regular rhythm, S1 normal, S2 normal and normal heart sounds.     Exam reveals no friction rub.       No murmur heard.  Regular rate and rhythm.  No murmur or friction rub.  Radial and DP pulses 2+ and symmetrical.   Pulmonary/Chest: Effort normal and breath sounds normal. No accessory muscle  usage. No tachypnea. No respiratory distress.   Respirations even and unlabored.  No adventitious sounds of breathing throughout anterior or posterior lung fields.   Abdominal: Abdomen is soft and flat. Bowel sounds are normal. He exhibits no distension. There is no abdominal tenderness.   Benign abdominal exam.  Soft, nontender, nondistended.  No rebound or guarding.  No tenderness to percussion.  Bowel sounds are present.  Negative Watson's sign.  No focal tenderness over McBurney's point. There is no rebound, no guarding, no tenderness at McBurney's point and negative Watson's sign.   Musculoskeletal:      Cervical back: Normal range of motion.      Comments: Midline surgical scar over the lower cervical spine.  Well healed, nontender, no erythema.  Spine palpated from occiput to sacrum, no midline bony tenderness.  No bony step-offs.  Minimal tenderness to palpation in the low thoracic back.       Neurological: He is alert and oriented to person, place, and time. GCS eye subscore is 4. GCS verbal subscore is 5. GCS motor subscore is 6.   Motor function and sensation intact and symmetrical in bilateral upper and lower extremities.  Ambulatory with a steady gait.   Skin: Skin is warm and dry. Capillary refill takes less than 2 seconds. No rash noted.         ED Course   Procedures  Labs Reviewed   COMPREHENSIVE METABOLIC PANEL - Normal       Result Value    Sodium 140      Potassium 4.3      Chloride 107      CO2 23      Glucose 103      BUN 12      Creatinine 1.0      Calcium 9.5      Protein Total 8.2      Albumin 4.2      Bilirubin Total 0.2      ALP 56      AST 22      ALT 35      Anion Gap 10      eGFR >60      Narrative:     Specimen slightly hemolyzed.    LIPASE - Normal    Lipase Level 22     CBC WITH DIFFERENTIAL - Normal    WBC 9.36      RBC 5.13      HGB 15.8      HCT 46.8      MCV 91      MCH 30.8      MCHC 33.8      RDW 11.9      Platelet Count 305      MPV 9.9      Nucleated RBC 0      Neut % 64.1       Lymph % 26.9      Mono % 6.7      Eos % 1.8      Basophil % 0.3      Imm Grans % 0.2      Neut # 5.99      Lymph # 2.52      Mono # 0.63      Eos # 0.17      Baso # 0.03      Imm Grans # 0.02     TROPONIN I - Normal    Troponin-I <0.006     URINALYSIS, REFLEX TO URINE CULTURE - Normal    Color, UA Yellow      Appearance, UA Clear      pH, UA 8.0      Spec Grav UA 1.020      Protein, UA Negative      Glucose, UA Negative      Ketones, UA Negative      Bilirubin, UA Negative      Blood, UA Negative      Nitrites, UA Negative      Urobilinogen, UA Negative      Leukocyte Esterase, UA Negative     CBC W/ AUTO DIFFERENTIAL    Narrative:     The following orders were created for panel order CBC W/ AUTO DIFFERENTIAL.  Procedure                               Abnormality         Status                     ---------                               -----------         ------                     CBC with Differential[[id number]]       Normal              Final result                 Please view results for these tests on the individual orders.   [facility] URINE HOLD   ISTAT PROCEDURE    POC Glucose 108      POC BUN 12      POC Creatinine 1.1      POC Sodium 142      POC Potassium 4.1      POC Chloride 105      POC TCO2 (MEASURED) 26      POC Anion Gap 16      POC Ionized Calcium 1.26      POC Hematocrit 47      Sample VENOUS      Site Other      Allens Test N/A     ISTAT CHEM8     EKG Readings: (Independently Interpreted)   Initial Reading: No STEMI.   Normal sinus rhythm with a ventricular rate of 82 beats per minute.  Normal intervals.   milliseconds.  Normal axis.  T-wave inversion in lead III, otherwise no ST segment or T-wave changes.  No STEMI.  No prior EKG for comparison.       Imaging Results              X-Ray Abdomen Flat And Erect (Final result)  Result time 06/09/25 16:45:23      Final result by Jose Benavidez MD (06/09/25 16:45:23)                   Impression:      No acute radiographic findings in the  abdomen.      Electronically signed by: Jose Benavidez MD  Date:    06/09/2025  Time:    16:45               Narrative:    EXAMINATION:  XR ABDOMEN FLAT AND ERECT    CLINICAL HISTORY:  Abdominal Pain;    TECHNIQUE:  Flat and erect AP views of the abdomen were performed.    COMPARISON:  04/26/2017    FINDINGS:  No high-grade bowel obstruction or large volume of free air.  No abnormal calcifications.  Osseous structures show no acute abnormalities.                                       X-Ray Chest AP Portable (Final result)  Result time 06/09/25 16:44:22      Final result by Jose Benavidez MD (06/09/25 16:44:22)                   Impression:      No acute abnormality.      Electronically signed by: Jose Benavidez MD  Date:    06/09/2025  Time:    16:44               Narrative:    EXAMINATION:  XR CHEST AP PORTABLE    CLINICAL HISTORY:  . Unspecified abdominal pain    TECHNIQUE:  Single frontal portable view of the chest was performed.    COMPARISON:  02/02/2021    FINDINGS:  Support devices: None    The lungs are clear, with normal appearance of pulmonary vasculature and no pleural effusion or pneumothorax.    The cardiac silhouette is normal in size. The hilar and mediastinal contours are unremarkable.    Osseous structures show no acute abnormalities.  Partially imaged postoperative changes of the cervical spine.                                       Medications   aluminum-magnesium hydroxide-simethicone 200-200-20 mg/5 mL suspension 30 mL (30 mLs Oral Given 6/9/25 1619)   famotidine tablet 40 mg (40 mg Oral Given 6/9/25 1619)     Medical Decision Making  39-year-old male presenting to the emergency department for evaluation of 1 month of back pain.  Pain radiates around his abdomen bilaterally and into the right upper quadrant of his abdomen and into his left chest.  Slightly worse with eating.  Denies fever, nausea, vomiting, diarrhea, constipation, or urinary symptoms.  No chronic use of gastric irritants.  No  chronic medical history.  No family cardiac history.  On exam, well-appearing and in no acute distress.  Vitals within normal limits.  Afebrile, no tachycardia, saturating well on room air.  Cardiac and lung exams within normal limits.  Benign abdominal exam with no focal tenderness.  Midline surgical scar over the cervical spine, the remainder of the MSK exam including focused exam of the spine was without concerning findings.    Differential diagnosis is broad and includes but is not limited to musculoskeletal pain, urolithiasis, urinary tract infection, pneumothorax, pneumonia, gallbladder disease, GERD, peptic ulcer disease, ACS, dissection, shingles.    Labs reassuring.  No leukocytosis or anemia.  CMP with no gross electrolyte abnormalities.  Kidney and liver function within normal limits.  I-STAT within normal limits.  Lipase within normal limits.  Troponin negative.  EKG is nonischemic.  X-ray of the chest and abdomen negative for any acute radiographic findings.  Unclear etiology of the patient's pain.  No evidence of emergent process.  Possibly musculoskeletal, though he denies any recent trauma or change in his activity.  No evidence of infectious process.  Low suspicion for ACS.  Heart score is 1.  With no clear etiology of patient's pain, I will treat this is a musculoskeletal back pain.  Discussed importance of following up with primary care as soon as possible for re-evaluation.  Patient and his wife at bedside voiced their understanding.  Stable for discharge home to outpatient follow up.    Return precautions were discussed, all patient questions were answered, and the patient was agreeable to the plan of care.  He was discharged home in stable condition and will follow up with his primary care provider or return to the emergency department if his symptoms worsen or do not improve.     Amount and/or Complexity of Data Reviewed  Labs: ordered. Decision-making details documented in ED  Course.  Radiology: ordered. Decision-making details documented in ED Course.    Risk  OTC drugs.  Prescription drug management.      Additional MDM:   Heart Score:    History:          Slightly suspicious.  ECG:             Normal  Age:               Less than 45 years  Risk factors: 1-2 risk factors  Troponin:       Less than or equal to normal limit  Heart Score = 1             Scribe Attestation:   Scribe #1: I performed the above scribed service and the documentation accurately describes the services I performed. I attest to the accuracy of the note.                               Clinical Impression:  Final diagnoses:  [R10.9] Abdominal pain (Primary)  [R07.9] Chest pain  [M54.6] Bilateral thoracic back pain, unspecified chronicity          ED Disposition Condition    Discharge Stable          ED Prescriptions       Medication Sig Dispense Start Date End Date Auth. Provider    predniSONE (DELTASONE) 20 MG tablet Take 2 tablets (40 mg total) by mouth once daily. for 3 days 6 tablet 6/9/2025 6/12/2025 Dakota Borjas PA-C    methocarbamoL (ROBAXIN) 500 MG Tab Take 2 tablets (1,000 mg total) by mouth 3 (three) times daily. for 5 days 30 tablet 6/9/2025 6/14/2025 Dakota Borjas PA-C    ibuprofen (ADVIL,MOTRIN) 800 MG tablet Take 1 tablet (800 mg total) by mouth every 6 (six) hours as needed for Pain. 20 tablet 6/9/2025 -- Dakota Borjas PA-C          Follow-up Information       Follow up With Specialties Details Why Contact Info    Valentin Chavez MD Family Medicine Schedule an appointment as soon as possible for a visit  As needed, If symptoms worsen [address]  [place] [address]  [phone]      Shelley Vegas FNP Family Medicine Schedule an appointment as soon as possible for a visit   [address] [address]  [address]  [place] [address] [phone]      [facility] - Emergency Dept Emergency Medicine Go to  If symptoms worsen [address] [place] [address] [facility]  [facility] [id number] [phone]          I, Dakota Borjas PA-C, personally performed the services described in this documentation. All medical record entries made by the scribe were at my direction and in my presence. I have reviewed the chart and agree that the record reflects my personal performance and is accurate and complete.               [1]   Social History  Tobacco Use    Smoking status: Never    Smokeless tobacco: Never   Substance Use Topics    Alcohol use: Yes     Comment: Social    Drug use: No        Dakota Borjas PA-C  06/09/25 1955

## 2025-06-09 NOTE — DISCHARGE INSTRUCTIONS

## 2025-06-10 ENCOUNTER — HOSPITAL ENCOUNTER (EMERGENCY)
Facility: HOSPITAL | Age: 40
Discharge: HOME | End: 2025-06-10
Attending: STUDENT IN AN ORGANIZED HEALTH CARE EDUCATION/TRAINING PROGRAM
Payer: COMMERCIAL

## 2025-06-10 VITALS
SYSTOLIC BLOOD PRESSURE: 136 MMHG | OXYGEN SATURATION: 97 % | HEART RATE: 60 BPM | WEIGHT: 270 LBS | HEIGHT: 71 IN | DIASTOLIC BLOOD PRESSURE: 87 MMHG | RESPIRATION RATE: 18 BRPM | BODY MASS INDEX: 37.8 KG/M2 | TEMPERATURE: 98 F

## 2025-06-10 DIAGNOSIS — M54.6 ACUTE RIGHT-SIDED THORACIC BACK PAIN: Primary | ICD-10-CM

## 2025-06-10 DIAGNOSIS — R20.2 PARESTHESIAS: ICD-10-CM

## 2025-06-10 DIAGNOSIS — R42 LIGHTHEADEDNESS: ICD-10-CM

## 2025-06-10 LAB
ABSOLUTE EOSINOPHIL (OHS): 0.01 K/UL
ABSOLUTE MONOCYTE (OHS): 0.72 K/UL (ref 0.3–1)
ABSOLUTE NEUTROPHIL COUNT (OHS): 8.56 K/UL (ref 1.8–7.7)
ALBUMIN SERPL BCP-MCNC: 4.6 G/DL (ref 3.5–5.2)
ALP SERPL-CCNC: 58 UNIT/L (ref 40–150)
ALT SERPL W/O P-5'-P-CCNC: 35 UNIT/L (ref 10–44)
ANION GAP (OHS): 12 MMOL/L (ref 8–16)
AST SERPL-CCNC: 21 UNIT/L (ref 11–45)
BASOPHILS # BLD AUTO: 0.02 K/UL
BASOPHILS NFR BLD AUTO: 0.2 %
BILIRUB SERPL-MCNC: 0.4 MG/DL (ref 0.1–1)
BNP SERPL-MCNC: 12 PG/ML (ref 0–99)
BUN SERPL-MCNC: 11 MG/DL (ref 6–20)
CALCIUM SERPL-MCNC: 10 MG/DL (ref 8.7–10.5)
CHLORIDE SERPL-SCNC: 104 MMOL/L (ref 95–110)
CO2 SERPL-SCNC: 22 MMOL/L (ref 23–29)
CREAT SERPL-MCNC: 0.9 MG/DL (ref 0.5–1.4)
D DIMER PPP IA.FEU-MCNC: <0.19 MG/L FEU
ERYTHROCYTE [DISTWIDTH] IN BLOOD BY AUTOMATED COUNT: 12.1 % (ref 11.5–14.5)
GFR SERPLBLD CREATININE-BSD FMLA CKD-EPI: >60 ML/MIN/1.73/M2
GLUCOSE SERPL-MCNC: 99 MG/DL (ref 70–110)
HCT VFR BLD AUTO: 48.9 % (ref 40–54)
HGB BLD-MCNC: 16.4 GM/DL (ref 14–18)
IMM GRANULOCYTES # BLD AUTO: 0.03 K/UL (ref 0–0.04)
IMM GRANULOCYTES NFR BLD AUTO: 0.2 % (ref 0–0.5)
INR PPP: 1 (ref 0.8–1.2)
LIPASE SERPL-CCNC: 10 U/L (ref 4–60)
LYMPHOCYTES # BLD AUTO: 3 K/UL (ref 1–4.8)
MAGNESIUM SERPL-MCNC: 2.2 MG/DL (ref 1.6–2.6)
MCH RBC QN AUTO: 30.3 PG (ref 27–31)
MCHC RBC AUTO-ENTMCNC: 33.5 G/DL (ref 32–36)
MCV RBC AUTO: 90 FL (ref 82–98)
NUCLEATED RBC (/100WBC) (OHS): 0 /100 WBC
PLATELET # BLD AUTO: 311 K/UL (ref 150–450)
PMV BLD AUTO: 9.4 FL (ref 9.2–12.9)
POTASSIUM SERPL-SCNC: 4.3 MMOL/L (ref 3.5–5.1)
PROT SERPL-MCNC: 8.7 GM/DL (ref 6–8.4)
PROTHROMBIN TIME: 11.6 SECONDS (ref 9–12.5)
RBC # BLD AUTO: 5.41 M/UL (ref 4.6–6.2)
RELATIVE EOSINOPHIL (OHS): 0.1 %
RELATIVE LYMPHOCYTE (OHS): 24.3 % (ref 18–48)
RELATIVE MONOCYTE (OHS): 5.8 % (ref 4–15)
RELATIVE NEUTROPHIL (OHS): 69.4 % (ref 38–73)
SODIUM SERPL-SCNC: 138 MMOL/L (ref 136–145)
TROPONIN I SERPL DL<=0.01 NG/ML-MCNC: <0.006 NG/ML
WBC # BLD AUTO: 12.34 K/UL (ref 3.9–12.7)

## 2025-06-10 PROCEDURE — 80053 COMPREHEN METABOLIC PANEL: CPT | Performed by: STUDENT IN AN ORGANIZED HEALTH CARE EDUCATION/TRAINING PROGRAM

## 2025-06-10 PROCEDURE — 85379 FIBRIN DEGRADATION QUANT: CPT | Performed by: STUDENT IN AN ORGANIZED HEALTH CARE EDUCATION/TRAINING PROGRAM

## 2025-06-10 PROCEDURE — 99285 EMERGENCY DEPT VISIT HI MDM: CPT | Mod: 25

## 2025-06-10 PROCEDURE — 96361 HYDRATE IV INFUSION ADD-ON: CPT

## 2025-06-10 PROCEDURE — 85025 COMPLETE CBC W/AUTO DIFF WBC: CPT | Performed by: STUDENT IN AN ORGANIZED HEALTH CARE EDUCATION/TRAINING PROGRAM

## 2025-06-10 PROCEDURE — 83690 ASSAY OF LIPASE: CPT | Performed by: STUDENT IN AN ORGANIZED HEALTH CARE EDUCATION/TRAINING PROGRAM

## 2025-06-10 PROCEDURE — 96360 HYDRATION IV INFUSION INIT: CPT

## 2025-06-10 PROCEDURE — 85610 PROTHROMBIN TIME: CPT | Performed by: STUDENT IN AN ORGANIZED HEALTH CARE EDUCATION/TRAINING PROGRAM

## 2025-06-10 PROCEDURE — 93005 ELECTROCARDIOGRAM TRACING: CPT

## 2025-06-10 PROCEDURE — 25000003 PHARM REV CODE 250: Performed by: STUDENT IN AN ORGANIZED HEALTH CARE EDUCATION/TRAINING PROGRAM

## 2025-06-10 PROCEDURE — 93010 ELECTROCARDIOGRAM REPORT: CPT | Mod: ,,, | Performed by: INTERNAL MEDICINE

## 2025-06-10 PROCEDURE — 83735 ASSAY OF MAGNESIUM: CPT | Performed by: STUDENT IN AN ORGANIZED HEALTH CARE EDUCATION/TRAINING PROGRAM

## 2025-06-10 PROCEDURE — 83880 ASSAY OF NATRIURETIC PEPTIDE: CPT | Performed by: STUDENT IN AN ORGANIZED HEALTH CARE EDUCATION/TRAINING PROGRAM

## 2025-06-10 PROCEDURE — 84484 ASSAY OF TROPONIN QUANT: CPT | Performed by: STUDENT IN AN ORGANIZED HEALTH CARE EDUCATION/TRAINING PROGRAM

## 2025-06-10 RX ORDER — SODIUM CHLORIDE 9 MG/ML
1000 INJECTION, SOLUTION INTRAVENOUS
Status: COMPLETED | OUTPATIENT
Start: 2025-06-10 | End: 2025-06-10

## 2025-06-10 RX ORDER — MECLIZINE HYDROCHLORIDE 25 MG/1
25 TABLET ORAL
Status: COMPLETED | OUTPATIENT
Start: 2025-06-10 | End: 2025-06-10

## 2025-06-10 RX ADMIN — SODIUM CHLORIDE 1000 ML: 9 INJECTION, SOLUTION INTRAVENOUS at 09:06

## 2025-06-10 RX ADMIN — MECLIZINE HYDROCHLORIDE 25 MG: 25 TABLET ORAL at 09:06

## 2025-06-11 LAB
OHS QRS DURATION: 82 MS
OHS QTC CALCULATION: 430 MS

## 2025-06-11 NOTE — ED PROVIDER NOTES
Encounter Date: 6/10/2025       History     Chief Complaint   Patient presents with    Fatigue     Pt arrived in ED, c/o lightheadedness and bilateral feet and hand tingling since this morning. Pt was seen in ED on yesterday and reports still having back and rib pain as well. Pt denies HA, unilateral weakness, or vision changes. Pt denies any CP, SOB, abd pain or n/v/d     39 y.o. male with history below presents for evaluation of multiple complaints.  This is patient's 2nd presentation, was evaluated yesterday.  Had reassuring lab work, reassuring exam, nonischemic EKG and was discharged home with presumptive diagnosis of myofascial related injury.  He was started on muscle relaxers, anti-inflammatory medication.  Today had persistent pain so took a muscle relaxer.  About an hour and a half later he developed some what he describes as lightheadedness, paresthesias of his hands and feet.  Still with back pain that he describes as right sided radiating to the right chest wall.  Not particularly pleuritic.  No dyspnea.  No fevers or chills.  No cough or congestion.  The patient otherwise denies Chest Pain, Shortness of Breath, Abdominal Pain, N/V/D/Constipation, Eye complaints or Visual changes, Ear complaints, and Myalgias    Triage vitals were reviewed and are: Initial Vitals [06/10/25 2008]  BP: 129/88  Pulse: 103  Resp: 20  Temp: 97.9 °F (36.6 °C)  SpO2: 96 %  MAP: n/a    The Patient:   has a past medical history of Neck injury, Stomach ulcer, and Tailbone injury.   has a past surgical history that includes Spine surgery.   reports that he has never smoked. He has never used smokeless tobacco. He reports current alcohol use. He reports that he does not use drugs.  Has allergies listed as: Mucinex [guaifenesin], Tylenol [acetaminophen], and Amoxicillin        The history is provided by the patient. No  was used.     Review of patient's allergies indicates:   Allergen Reactions    Mucinex  [guaifenesin] Hives    Tylenol [acetaminophen] Hives    Amoxicillin Rash     Past Medical History:   Diagnosis Date    Neck injury     Stomach ulcer     Tailbone injury      Past Surgical History:   Procedure Laterality Date    SPINE SURGERY       No family history on file.  Social History[1]  Review of Systems   All other systems reviewed and are negative.      Physical Exam     Initial Vitals [06/10/25 2008]   BP Pulse Resp Temp SpO2   129/88 103 20 97.9 °F (36.6 °C) 96 %      MAP       --         Physical Exam    Nursing note and vitals reviewed.  Constitutional: He appears well-developed and well-nourished.   Body mass index is 37.66 kg/m².   HENT:   Head: Normocephalic and atraumatic.   Eyes: EOM are normal. Pupils are equal, round, and reactive to light.   Neck: Neck supple.   Cardiovascular:  Normal rate, regular rhythm, normal heart sounds and intact distal pulses.           Pulmonary/Chest: Breath sounds normal. No respiratory distress.   Abdominal: Abdomen is soft. He exhibits no distension. There is no abdominal tenderness.   Musculoskeletal:      Cervical back: Neck supple.      Comments: Focused exam of the cervical, thoracic, and lumbar spine without midline tenderness, without step deformity, without bilateral paraspinal muscle tenderness or palpable spasm. Full AROM .    Gait is essentially Normal. Ambulates without assistance or assistive device. Able to transfer to and from the exam table without assistance. No obvious leg length discrepancy.  Skin is warm, dry, intact w/o effusions / edema / erythema / ecchymosis / masses / lesions / obvious deformities / obvious muscle wasting.   Distal Pulses 2+.     Special Tests:  (-) Spurling's Test (Cervical)  (-) Straight Leg Raise Bilaterally        Neurological: GCS eye subscore is 4. GCS verbal subscore is 5. GCS motor subscore is 6.   Patient is alert and oriented to person, place, time, and event.   GCS 15: Motor 6, Verbal 5, Eye 4.   No facial droop,  dysarthria, or aphasia.    (-) Romberg    CN 2-12 Intact.   -Patient has no deviation of baseline vision. Normal Confrontation (CN II)  -Extraocular movements are full, physiologic nystagmus is present.  No multi-directional, vertical or torsional nystagmus.  Eyes converge equally and pupils constrict with near focus.  No skew deviation.  (CN III, IV, VI)  -Patient able to fully open and close jaw. Equal sensation over bilateral temples, cheeks, and jawline. (CN V)  -Patient able to raise eyebrows and expand cheeks (CN VII)  -Patient able to lateralize sounds bilaterally (CN VIII)  -Uvula is midline and rises symmetrically with soft palate on phonation, active gag reflex (CN IX, X)  -Patient with equal rise of shoulders and equal strength on resisted rotation of neck b/l. Strength 5/5. (CN XI)  -Patient able to stick out tongue at midline and move side to side, resists against deviation by me (CN XII)    No decreased proprioception sensation to suggest dorsal column injury. Patient able to verbalize up/down of DIP joint. Patient able to identify pen in hand with eyes closed.   No grossly decreased sensation to light touch, pain, or pressure to suggest spinothalamic pathway injury.  No cerebellar signs:  -No dysmetria. Finger-To-Nose b/l with smooth movements and no overshoot.   -No dysdiadochokinesis. Hand rapid alternating movements are quick, smooth, and rhythmic b/l.   -No pronator drift.    Gait is essentially Normal. Ambulates without assistance or assistive device. Able to transfer to and from the exam table without assistance.     Gross Strength testing:   L        R  [5/5]  [5/5]  Upper  [5/5]  [5/5]  Lower  [5/5]  [5/5]       Skin: Skin is warm and dry. Capillary refill takes less than 2 seconds.   Psychiatric: He has a normal mood and affect. His behavior is normal.   Appears anxious         ED Course   Procedures  Labs Reviewed   COMPREHENSIVE METABOLIC PANEL - Abnormal       Result Value    Sodium  138      Potassium 4.3      Chloride 104      CO2 22 (*)     Glucose 99      BUN 11      Creatinine 0.9      Calcium 10.0      Protein Total 8.7 (*)     Albumin 4.6      Bilirubin Total 0.4      ALP 58      AST 21      ALT 35      Anion Gap 12      eGFR >60     CBC WITH DIFFERENTIAL - Abnormal    WBC 12.34      RBC 5.41      HGB 16.4      HCT 48.9      MCV 90      MCH 30.3      MCHC 33.5      RDW 12.1      Platelet Count 311      MPV 9.4      Nucleated RBC 0      Neut % 69.4      Lymph % 24.3      Mono % 5.8      Eos % 0.1      Basophil % 0.2      Imm Grans % 0.2      Neut # 8.56 (*)     Lymph # 3.00      Mono # 0.72      Eos # 0.01      Baso # 0.02      Imm Grans # 0.03     D DIMER, QUANTITATIVE - Normal    D-Dimer <0.19     B-TYPE NATRIURETIC PEPTIDE - Normal    BNP 12     MAGNESIUM - Normal    Magnesium  2.2     TROPONIN I - Normal    Troponin-I <0.006     LIPASE - Normal    Lipase Level 10     PROTIME-INR - Normal    PT 11.6      INR 1.0     CBC W/ AUTO DIFFERENTIAL    Narrative:     The following orders were created for panel order CBC auto differential.  Procedure                               Abnormality         Status                     ---------                               -----------         ------                     CBC with Differential[[id number]]       Abnormal            Final result                 Please view results for these tests on the individual orders.   DRUG SCREEN PANEL, URINE EMERGENCY          Imaging Results              CT Head Without Contrast (Final result)  Result time 06/10/25 22:02:25      Final result by Gely Correa MD (06/10/25 22:02:25)                   Impression:      No acute intracranial abnormalities identified.      Electronically signed by: Gely Correa MD  Date:    06/10/2025  Time:    22:02               Narrative:    EXAMINATION:  CT HEAD WITHOUT CONTRAST    CLINICAL HISTORY:  Dizziness, persistent/recurrent, cardiac or vascular cause  suspected;    TECHNIQUE:  Low dose axial images were obtained through the head.  Coronal and sagittal reformations were also performed. Contrast was not administered.    COMPARISON:  CT head from April 2018.    FINDINGS:  No evidence of acute/recent major vascular distribution cerebral infarction, intraparenchymal hemorrhage, or intra-axial space occupying lesion. The ventricular system is normal in size and configuration with no evidence of hydrocephalus. No effacement of the skull-base cisterns. No abnormal extra-axial fluid collections or blood products. Visualized paranasal sinuses and mastoid air cells are essentially clear.  Remote facial fractures and postop ORIF changes are seen.  The calvarium shows no significant abnormality.                                       Medications   0.9% NaCl infusion (0 mLs Intravenous Stopped 6/10/25 2234)   meclizine tablet 25 mg (25 mg Oral Given 6/10/25 2104)     Medical Decision Making  Encounter Date: 6/10/2025  --------------------------------------------------------------------------  39 y.o. male presents for evaluation of back pain, hand and feet paresthesias, dizziness/lightheadedness.  Hemodynamically stable. Afebrile. Phonating and protecting the airway spontaneously. No clinical evidence for cardiovascular instability or impending airway compromise.  Remainder of physical exam as above.    Additional or Independent Historians available and contributing to the history as above: NONE  Prior medical records, when available, were reviewed. This includes a review of the patients comorbidities, medications, and recent hospital or outpatient notes.   Comorbid Conditions affecting evaluation, treatment or discharge planning: NONE IDENTIFIED   Social Determinates of Health identified and considered in the evaluation and treatment of this patient: None Identified or significantly impacting evaluation and treatment    Differential diagnoses includes but is not limited to:  "  Cauda equina syndrome, diskitis/osteomyelitis, epidural/paraspinal abscess, AAA, aortic dissection, post-op/hardware infection, trauma/vertebral fracture, spinal cord injury, disc herniation, spinal stenosis, sciatica, radiculopathy, neoplasm, muscle strain, muscle spasm, neuropathic pain, UTI/pyelonephritis, nephrolithiasis.  Peripheral vertigo (labyrinthitis, vestibular neuritis, BPPV, Meniere's disease), cerebellar stroke/CVA, TIA, SAH, carotid artery dissection, intracranial mass, medication reaction/noncompliance, substance abuse, depression, electrolyte abnormality, anemia, hemorrhage, renal failure, hepatic failure, sepsis/infection, UTI, viral illness, arrhythmia, CHF, thyroid disease, dehydration, depression, chronic disease.  --------------------------------------------------------------------------  All available clinical tests were reviewed by me and documented in the ED course.  Vitals range:   Temp:  [97.9 °F (36.6 °C)-98.1 °F (36.7 °C)] 98.1 °F (36.7 °C)  Pulse:  [] 60  Resp:  [18-20] 18  SpO2:  [96 %-97 %] 97 %  BP: (129-136)/(87-88) 136/87  Estimated body mass index is 37.66 kg/m² as calculated from the following:    Height as of this encounter: 5' 11" (1.803 m).    Weight as of this encounter: 122.5 kg (270 lb).    ED MEDS GIVEN:  Medications  0.9% NaCl infusion (0 mLs Intravenous Stopped 6/10/25 2234)  meclizine tablet 25 mg (25 mg Oral Given 6/10/25 2104)    Procedures Performed: None  --------------------------------------------------------------------------  Problem #1:  Right-sided thoracic back pain  Patient with a right-sided thoracic back pain similar to prior presentation yesterday.  No red flags.  Had repeat evaluation today which was unremarkable.  EKG nonischemic, troponin negative x1.  D-dimer negative.  Low suspicion for pulmonary embolus, ACS, pneumonia, pneumothorax.  I do suspect this is myofascial in nature.  Discussed that anti-inflammatory medication does take time to " significantly improve symptoms.  He was instructed to follow up with the primary care provider or return for worsening symptoms.    Problem #2:  Lightheadedness, dizziness, paresthesias  Patient presents with dizziness after taking muscle relaxer, subsequent some anxiety and distal paresthesias which likely attributed to his anxiety reaction.  Lab work reassuring.  Neurologically intact, CT head noncontrast is unremarkable.  No evidence of central cause of his lightheadedness.  Feeling improved after Antivert and IV fluids.    I see no indication of an emergent process beyond that addressed during our encounter but have duly counseled the patient/family regarding the need for prompt follow-up as well as the indications that should prompt immediate return to the emergency room should new or worrisome developments occur.  The patient/family has been provided with verbal and printed direction regarding our final diagnosis(es) as well as instructions regarding use of OTC and/or Rx medications intended to manage the patient's conditions.   The patient/family communicates understanding of all this information and all remaining questions and concerns were addressed at this time.  DISCLAIMER: This note was prepared with [employer] voice recognition transcription software. Garbled syntax, mangled pronouns, and other bizarre constructions may be attributed to that software system.    Final diagnoses:  [M54.6] Acute right-sided thoracic back pain (Primary)  [R20.2] Paresthesias  [R42] Lightheadedness                Amount and/or Complexity of Data Reviewed  Labs: ordered. Decision-making details documented in ED Course.  Radiology: ordered. Decision-making details documented in ED Course.  ECG/medicine tests:  Decision-making details documented in ED Course.    Risk  Prescription drug management.               ED Course as of 06/10/25 2240   Tue Jose G 10, 2025   2035 BP: 129/88 [AN]   2035 Temp: 97.9 °F (36.6 °C) [AN]   2035 Pulse:  103 [AN]   2035 Resp: 20 [AN]   2035 SpO2: 96 % [AN]   2043 EKG 12-lead  EKG independently interpreted by me after review by attending physician.  Demonstrates sinus rhythm rate of 86 beats per minute.  Normal axis, normal intervals.  Isolated TWI lead AVF.  No ST elevation, depression.  No STEMI. [AN]   2214 CBC auto differential(!)  No leukocytosis.  No anemia.  Normal platelets. [AN]   2214 Comprehensive metabolic panel(!)  No significant metabolic abnormality noting slightly decreased bicarb with normal anion gap. [AN]   2214 Magnesium [AN]   2214 Lipase [AN]   2214 Protime-INR [AN]   2214 Brain natriuretic peptide [AN]   2214 Troponin I [AN]   2214 D dimer, quantitative [AN]   2215 CT Head Without Contrast  No acute intracranial abnormality. [AN]      ED Course User Index  [AN] Zhen Lundy PA-C                           Clinical Impression:  Final diagnoses:  [M54.6] Acute right-sided thoracic back pain (Primary)  [R20.2] Paresthesias  [R42] Lightheadedness          ED Disposition Condition    Discharge Stable          ED Prescriptions    None       Follow-up Information       Follow up With Specialties Details Why Contact Info    Valentin Chavez MD Family Medicine Schedule an appointment as soon as possible for a visit in 3 days  [address] [facility] [place] [place] [address]  [phone]      [facility] Dept Emergency Medicine Go to  As needed, If symptoms worsen [address] [facility] [place] [address] [phone]                   [1]   Social History  Tobacco Use    Smoking status: Never    Smokeless tobacco: Never   Substance Use Topics    Alcohol use: Yes     Comment: Social    Drug use: No        Zhen Lundy PA-C  06/10/25 0413

## 2025-06-11 NOTE — DISCHARGE INSTRUCTIONS
Problem Specific Instructions:  Often, back pain improves with time and proper stretching.  Ensure you continue to perform your normal activities, take any medication that was prescribed to you and follow up with your primary care provider. Return to the Emergency Department if you experience worsening periods of confusion, loss of consciousness, numbness/tingling, weakness, trouble speaking, vision changes, chest pain, shortness of breath, or any other worsening symptoms.     If you received or are discharged with pain medicine or muscle relaxers, understand that they can make you sleepy or impair your judgement. Do not make important decisions, drink, drive, swim or perform any other tasks you would not otherwise perform while impaired for at least 24 hours after your last dose.      Ensure you follow up with your Primary Care Provider or any additional providers listed on this discharge sheet. While you may be healthy enough to go home today, I cannot predict the exact course of your diagnoses. It is important to remember that some problems are difficult to diagnose and may not be found during your first visit. As such, it is your responsibility to monitor symptoms, follow-up with another healthcare provider, or return to the emergency room for new or worsening concerns. Unless otherwise instructed, continue all home medications and any new medications prescribed to you in the Emergency Department.

## 2025-07-12 ENCOUNTER — HOSPITAL ENCOUNTER (EMERGENCY)
Facility: HOSPITAL | Age: 40
Discharge: HOME | End: 2025-07-12
Attending: EMERGENCY MEDICINE
Payer: COMMERCIAL

## 2025-07-12 VITALS
OXYGEN SATURATION: 96 % | HEART RATE: 73 BPM | HEIGHT: 71 IN | BODY MASS INDEX: 36.4 KG/M2 | RESPIRATION RATE: 18 BRPM | WEIGHT: 260 LBS | TEMPERATURE: 98 F | DIASTOLIC BLOOD PRESSURE: 89 MMHG | SYSTOLIC BLOOD PRESSURE: 131 MMHG

## 2025-07-12 DIAGNOSIS — M54.12 CERVICAL RADICULOPATHY: Primary | ICD-10-CM

## 2025-07-12 DIAGNOSIS — M54.2 NECK PAIN: ICD-10-CM

## 2025-07-12 PROCEDURE — 25000003 PHARM REV CODE 250

## 2025-07-12 PROCEDURE — 99284 EMERGENCY DEPT VISIT MOD MDM: CPT | Mod: 25

## 2025-07-12 PROCEDURE — 63600175 PHARM REV CODE 636 W HCPCS: Mod: JZ,TB

## 2025-07-12 PROCEDURE — 96372 THER/PROPH/DIAG INJ SC/IM: CPT

## 2025-07-12 RX ORDER — MELOXICAM 15 MG/1
15 TABLET ORAL DAILY
Qty: 5 TABLET | Refills: 0 | Status: SHIPPED | OUTPATIENT
Start: 2025-07-12 | End: 2025-07-17

## 2025-07-12 RX ORDER — LIDOCAINE 50 MG/G
1 PATCH TOPICAL
Status: DISCONTINUED | OUTPATIENT
Start: 2025-07-12 | End: 2025-07-12 | Stop reason: HOSPADM

## 2025-07-12 RX ORDER — KETOROLAC TROMETHAMINE 30 MG/ML
15 INJECTION, SOLUTION INTRAMUSCULAR; INTRAVENOUS
Status: COMPLETED | OUTPATIENT
Start: 2025-07-12 | End: 2025-07-12

## 2025-07-12 RX ORDER — DEXAMETHASONE SODIUM PHOSPHATE 4 MG/ML
8 INJECTION, SOLUTION INTRA-ARTICULAR; INTRALESIONAL; INTRAMUSCULAR; INTRAVENOUS; SOFT TISSUE
Status: COMPLETED | OUTPATIENT
Start: 2025-07-12 | End: 2025-07-12

## 2025-07-12 RX ORDER — LIDOCAINE 50 MG/G
1 PATCH TOPICAL DAILY
Qty: 5 PATCH | Refills: 0 | Status: SHIPPED | OUTPATIENT
Start: 2025-07-12 | End: 2025-07-17

## 2025-07-12 RX ORDER — CYCLOBENZAPRINE HCL 10 MG
10 TABLET ORAL 3 TIMES DAILY PRN
Qty: 15 TABLET | Refills: 0 | Status: SHIPPED | OUTPATIENT
Start: 2025-07-12 | End: 2025-07-17

## 2025-07-12 RX ADMIN — KETOROLAC TROMETHAMINE 15 MG: 30 INJECTION, SOLUTION INTRAMUSCULAR; INTRAVENOUS at 01:07

## 2025-07-12 RX ADMIN — LIDOCAINE 1 PATCH: 50 PATCH TOPICAL at 01:07

## 2025-07-12 RX ADMIN — DEXAMETHASONE SODIUM PHOSPHATE 8 MG: 4 INJECTION, SOLUTION INTRA-ARTICULAR; INTRALESIONAL; INTRAMUSCULAR; INTRAVENOUS; SOFT TISSUE at 01:07

## 2025-07-12 NOTE — ED PROVIDER NOTES
Encounter Date: 7/12/2025    SCRIBE #1 NOTE: IKelsi, am scribing for, and in the presence of,  Marge Mckeon PA-C. I have scribed the following portions of the note - Other sections scribed: HPI, ROS, PE.       History     Chief Complaint   Patient presents with    Neck Pain    Numbness     Pt to ED from home with c/o neck pain and upper back pain x 6 days accompanied by left arm numbness x 2 days, (mainly when turning head to the left). Pt reports spinal surgery in 2007. Pt denies slurred speech, weakness, vision changes or further complaints.      39 y.o. M with PMHx of neck and tailbone injury s/p spine surgery (2007,) stomach ulcer who presents to the Emergency Department for evaluation of atraumatic neck pain and associated LUE numbness x 6 days. Patient reports pain noticed when he turns his head to the left, feels the pain in left his pectoral and left-sided trapezius area, intermittent pain to LUE and numbness in all of the fingertips of his left hand. Reports that he sleeps on his right side. Denies recent trauma/injury, Attempted treatment with Ibuprofen without relief. He denies any associated chest pain, dyspnea, (unilateral) weakness, jaw pain, swelling, redness, rash, limited ROM, URI symptoms, or other associated symptoms.     ED Encounters: 07/10/2025 CT Head: Impression: No acute intracranial abnormalities identified. Remote facial fractures and postop ORIF changes are seen. Patient had an extensive cardiac workup both visits 07/09 dc'd with methocarbamol 500 mg, Ibuprofen 800 mg, and prednisone 20 mg, and 07/10, all benign, CBC without signs of infection or anemia. CMP without significant metabolic abnormalities, CO2 22, given meclizine, 0.9% NaCl infusion, discharged in stable condition.       The history is provided by the patient and medical records. No  was used.     Review of patient's allergies indicates:   Allergen Reactions    Robaxin [methocarbamol]  Shortness Of Breath    Mucinex [guaifenesin] Hives    Tylenol [acetaminophen] Hives    Amoxicillin Rash     Past Medical History:   Diagnosis Date    Neck injury     Stomach ulcer     Tailbone injury      Past Surgical History:   Procedure Laterality Date    SPINE SURGERY       No family history on file.  Social History[1]  Review of Systems   Constitutional:  Negative for chills and fever.   HENT:  Negative for congestion and sore throat.    Eyes:  Negative for visual disturbance.   Respiratory:  Negative for cough and shortness of breath.    Cardiovascular:  Negative for chest pain.   Gastrointestinal:  Negative for abdominal pain, nausea and vomiting.   Genitourinary:  Negative for dysuria.   Musculoskeletal:  Positive for arthralgias (L arm) and neck pain (turn to left, l pectoral and trapezius). Negative for neck stiffness.   Skin:  Negative for rash.   Neurological:  Positive for numbness (fingertips). Negative for facial asymmetry, speech difficulty, weakness and headaches.   Psychiatric/Behavioral:  Negative for confusion.        Physical Exam     Initial Vitals [07/12/25 1219]   BP Pulse Resp Temp SpO2   (!) 156/94 86 19 98.1 °F (36.7 °C) 96 %      MAP       --         Physical Exam    Nursing note and vitals reviewed.  Constitutional: He appears well-developed and well-nourished. He is not diaphoretic. No distress.   HENT:   Head: Normocephalic and atraumatic.   Right Ear: External ear normal.   Left Ear: External ear normal.   Eyes: Conjunctivae and EOM are normal.   Neck: No tracheal deviation present. No JVD present.   Normal range of motion.  Cardiovascular:  Normal rate.           Pulmonary/Chest: No stridor. No respiratory distress.   Musculoskeletal:      Cervical back: Normal range of motion.      Comments: Reproducible pain to left trapezius. Pain worse when turning neck to the left. No midline cervical, thoracic, or lumbar tenderness. No step-offs or crepitus. 2+ radial pulses, 5/5 strength,  equal and intact sensation to bilateral upper extremities.      Neurological: He is alert and oriented to person, place, and time.   Skin: No rash noted. No erythema.   Psychiatric: He has a normal mood and affect.         ED Course   Procedures  Labs Reviewed - No data to display       Imaging Results              X-Ray Cervical Spine AP And Lateral (Final result)  Result time 07/12/25 13:42:24      Final result by Malcom Howe DO (07/12/25 13:42:24)                   Impression:      As above      Electronically signed by: Malcom Howe DO  Date:    07/12/2025  Time:    13:42               Narrative:    EXAMINATION:  XR CERVICAL SPINE AP LATERAL    CLINICAL HISTORY:  Cervicalgia    TECHNIQUE:  AP, lateral and open mouth views of the cervical spine were performed.    COMPARISON:  01/25/2007    FINDINGS:  Pillar screws and fixation rods seen bilaterally at the C3 through C6 levels and evidence for prior ACDF at the C4-5 level.  No hardware failure suggested.  There is straightening of the normal lordotic curvature.  No significant facet arthropathy identified.  The C1-C2 articulation is within normal limits.                                       Medications   LIDOcaine 5 % patch 1 patch (1 patch Transdermal Patch Applied 7/12/25 1304)   ketorolac injection 15 mg (15 mg Intramuscular Given 7/12/25 1305)   dexAMETHasone injection 8 mg (8 mg Intramuscular Given 7/12/25 1351)     Medical Decision Making  39 y.o. M with PMHx of neck and tailbone injury s/p spine surgery (2007,) stomach ulcer who presents to the Emergency Department for evaluation of atraumatic neck pain and associated LUE numbness x 6 days. Patient reports pain noticed when he turns his head to the left, feels the pain in left his pectoral and left-sided trapezius area, intermittent pain to LUE and numbness in all of the fingertips of his left hand. Reports that he sleeps on his right side. Denies recent trauma/injury, Attempted treatment with  Ibuprofen without relief. He denies any associated chest pain, dyspnea, (unilateral) weakness, jaw pain, swelling, redness, rash, limited ROM, URI symptoms, or other associated symptoms.     Differentials include but are not limited to Fracture, dislocation, compartment syndrome, nerve injury/palsy, vascular injury, DVT, rhabdomyolysis, hemarthrosis, septic joint, cellulitis, bursitis, muscle strain, ligament tear/sprain, laceration, foreign body, abrasion, soft tissue contusion, osteoarthritis, osteomyelitis.    Patient's physical exam most consistent with cervical radiculopathy.  X-ray without acute findings.  Patient given Toradol in the emergency department, discussed taking anti-inflammatories, muscle relaxers and lidocaine patches at.  Patient expressed understanding.  All questions and concerns addressed prior to discharge.  Patient given return precautions. I discussed with the patient the diagnosis, treatment plan, indications for return to the emergency department, and for expected follow-up. The patient verbalized an understanding. The patient is asked if there are any questions or concerns. We discuss the case, until all issues are addressed to the patient's satisfaction. Patient understands and is agreeable to the plan.      Amount and/or Complexity of Data Reviewed  External Data Reviewed: labs, radiology and notes.     Details: See HPI.   Radiology: ordered. Decision-making details documented in ED Course.    Risk  Prescription drug management.            Scribe Attestation:   Scribe #1: I performed the above scribed service and the documentation accurately describes the services I performed. I attest to the accuracy of the note.                                   Clinical Impression:  Final diagnoses:  [M54.2] Neck pain  [M54.12] Cervical radiculopathy (Primary)          ED Disposition Condition    Discharge Stable          ED Prescriptions       Medication Sig Dispense Start Date End Date Auth.  Provider    LIDOcaine (LIDODERM) 5 % Place 1 patch onto the skin once daily. Remove & Discard patch within 12 hours or as directed by MD for 5 days 5 patch 7/12/2025 7/17/2025 Marge Mckeon PA-C    meloxicam (MOBIC) 15 MG tablet Take 1 tablet (15 mg total) by mouth once daily. for 5 days 5 tablet 7/12/2025 7/17/2025 Marge Mckeon PA-C    cyclobenzaprine (FLEXERIL) 10 MG tablet Take 1 tablet (10 mg total) by mouth 3 (three) times daily as needed for Muscle spasms. 15 tablet 7/12/2025 7/17/2025 Marge Mckeon PA-C          Follow-up Information       Follow up With Specialties Details Why Contact Info    Valentin Chavez MD Family Medicine   [address] [facility] [place] [address]  [phone]      [facility] Emergency Dept Emergency Medicine Go to  for new or worsening symptoms [address] [place], [place] [address] [facility] [place] [address] [phone]            I, Marge Mckeon PA-C, personally performed the services described in this documentation. All medical record entries made by the scribe were at my direction and in my presence. I have reviewed the chart and agree that the record reflects my personal performance and is accurate and complete.      DISCLAIMER: This note was prepared with [employer] voice recognition transcription software. Garbled syntax, mangled pronouns, and other bizarre constructions may be attributed to that software system.          [1]   Social History  Tobacco Use    Smoking status: Never    Smokeless tobacco: Never   Vaping Use    Vaping status: Never Used   Substance Use Topics    Alcohol use: Yes     Comment: Social    Drug use: No        Marge Mckeon PA-C  07/12/25 0902

## 2025-07-12 NOTE — DISCHARGE INSTRUCTIONS
Thank you for coming to our Emergency Department today. It is important to remember that some problems or medical conditions are difficult to diagnose and may not be found or addressed during your Emergency Department visit.  These conditions often start with non-specific symptoms and can only be diagnosed on follow up visits with your primary care physician or specialist when the symptoms continue or change. Please remember that all medical conditions can change, and we cannot predict how you will be feeling tomorrow or the next day. Return to the ER with any questions/concerns, new/concerning symptoms, worsening or failure to improve.       Be sure to follow up with your primary care doctor and review all labs/imaging/tests that were performed during your ER visit with them. It is very common for us to identify non-emergent incidental findings which must be followed up with your primary care physician.  Some labs/imaging/tests may be outside of the normal range, and require non-emergent follow-up and/or further investigation/treatment/procedures/testing to help diagnose/exclude/prevent complications or other potentially serious medical conditions. Some abnormalities may not have been discussed or addressed during your ER visit.     An ER visit does not replace a primary care visit, and many screening tests or follow-up tests cannot be ordered by an ER doctor or performed by the ER. Some tests may even require pre-approval.    If you do not have a primary care doctor, you may contact the one listed on your discharge paperwork or you may also call the [facility] Appointment Desk at [phone] , or [address] [place] [address] at  [phone] to schedule an appointment, or establish care with a primary care doctor or even a specialist and to obtain information about local resources. It is important to your health that you have a primary care doctor.    Please take all medications as directed. We have done our best to select  a medication for you that will treat your condition however, all medications may potentially have side-effects and it is impossible to predict which medications may give you side-effects or what those side-effects (if any) those medications may give you.  If you feel that you are having a negative effect or side-effect of any medication you should stop taking those medications immediately and seek medical attention. If you feel that you are having a life-threatening reaction call 911.        Do not drive, swim, climb to height, take a bath, operate heavy machinery, drink alcohol or take potentially sedating medications, sign any legal documents or make any important decisions for 24 hours if you have received any pain medications, sedatives or mood altering drugs during your ER visit or within 24 hours of taking them if they have been prescribed to you.     You can find additional resources for Dentists, hearing aids, durable medical equipment, low cost pharmacies and other resources at [email or web address]

## 2025-07-18 ENCOUNTER — HOSPITAL ENCOUNTER (EMERGENCY)
Facility: HOSPITAL | Age: 40
Discharge: HOME | End: 2025-07-18
Attending: EMERGENCY MEDICINE
Payer: COMMERCIAL

## 2025-07-18 VITALS
SYSTOLIC BLOOD PRESSURE: 135 MMHG | WEIGHT: 265 LBS | HEART RATE: 93 BPM | DIASTOLIC BLOOD PRESSURE: 88 MMHG | RESPIRATION RATE: 18 BRPM | HEIGHT: 71 IN | TEMPERATURE: 98 F | BODY MASS INDEX: 37.1 KG/M2 | OXYGEN SATURATION: 96 %

## 2025-07-18 DIAGNOSIS — R20.2 PARESTHESIAS: ICD-10-CM

## 2025-07-18 DIAGNOSIS — M54.12 CERVICAL RADICULOPATHY: Primary | ICD-10-CM

## 2025-07-18 PROCEDURE — 93005 ELECTROCARDIOGRAM TRACING: CPT

## 2025-07-18 PROCEDURE — 93010 ELECTROCARDIOGRAM REPORT: CPT | Mod: ,,, | Performed by: INTERNAL MEDICINE

## 2025-07-18 PROCEDURE — 99284 EMERGENCY DEPT VISIT MOD MDM: CPT | Mod: 25

## 2025-07-18 RX ORDER — NAPROXEN 500 MG/1
500 TABLET ORAL 2 TIMES DAILY WITH MEALS
Qty: 60 TABLET | Refills: 0 | Status: SHIPPED | OUTPATIENT
Start: 2025-07-18

## 2025-07-18 RX ORDER — GABAPENTIN 100 MG/1
100 CAPSULE ORAL 3 TIMES DAILY
Qty: 90 CAPSULE | Refills: 0 | Status: SHIPPED | OUTPATIENT
Start: 2025-07-18 | End: 2025-08-17

## 2025-07-18 NOTE — ED PROVIDER NOTES
"Encounter Date: 7/18/2025    SCRIBE #1 NOTE: I, Simin Soriano, am scribing for, and in the presence of,  Delores Brooks PA-C. I have scribed the following portions of the note - Other sections scribed: HPI, ROS,.       History     Chief Complaint   Patient presents with    Arm Pain     Pt c/o pain to left arm radiating down to elbow with tingling in his fingers on and off x2 weeks. Pt denied chest pain or SOB.      Heri Gordillo is a 39 y.o. male, with a PMHx of neck pain, stomach ulcer, tailbone injury s/p spine surgery(2007),, who presents to the ED with complaint of intermittent left arm pain radiating to left elbow that began 2 weeks ago. Patient reports associated numbness/paresthesias to upper extremities. Reports exacerbation of pain when turning his head to the left. Attempted treatment with prescribed muscle relaxer( meloxicam) and lidocaine patches with little relief. Reports history of cervical spine fusion surgery( C4-C7) in 2007. Denies follow-up with orthopedics or neurosurgery. No other exacerbating or alleviating factors. Denies fever, chills, chest pain, dyspnea, abdominal pain,nausea, emesis, lightheadedness, dizziness, or other associated symptoms.     Per chart review 7/12/25, patient presented to this ED with complaint of atraumatic neck pain with associated LUE numbness. XR Cervical spine impression revealed:  "Pillar screws and fixation rods seen bilaterally at the C3 through C6 levels and evidence for prior ACDF at the C4-5 level.  No hardware failure suggested.  There is straightening of the normal lordotic curvature.  No significant facet arthropathy identified.  The C1-C2 articulation is within normal limits."  Patient was discharged with lidocaine patches, meloxicam, and cyclobenzaprine.      The history is provided by the patient. No  was used.     Review of patient's allergies indicates:   Allergen Reactions    Robaxin [methocarbamol] Shortness Of Breath    Mucinex " [guaifenesin] Hives    Tylenol [acetaminophen] Hives    Amoxicillin Rash     Past Medical History:   Diagnosis Date    Neck injury     Stomach ulcer     Tailbone injury      Past Surgical History:   Procedure Laterality Date    SPINE SURGERY       No family history on file.  Social History[1]  Review of Systems   Constitutional:  Negative for fever.   HENT:  Negative for congestion and sore throat.    Eyes:  Negative for visual disturbance.   Respiratory:  Negative for shortness of breath.    Cardiovascular:  Negative for chest pain.   Gastrointestinal:  Negative for nausea.   Genitourinary:  Negative for dysuria.   Musculoskeletal:  Positive for arthralgias (left arm pain radaites to left elbow). Negative for neck pain.   Skin:  Negative for rash.   Neurological:  Negative for weakness and headaches.        (+) paresthesias to left upper extremity    Psychiatric/Behavioral:  Negative for confusion.        Physical Exam     Initial Vitals [07/18/25 1001]   BP Pulse Resp Temp SpO2   135/88 93 18 97.9 °F (36.6 °C) 96 %      MAP       --         Physical Exam    Vitals reviewed.  Constitutional: He appears well-developed and well-nourished. No distress.   HENT:   Head: Normocephalic.   Eyes: Conjunctivae are normal.   Neck:   No midline tenderness or step-offs appreciated.  Posterior cervical scar noted.  No tenderness to palpation to bilateral cervical paraspinal muscles.  When lateral rotation to the left patient has reproducible shooting pain into left shoulder and arm.   Pulmonary/Chest: No respiratory distress.   Musculoskeletal:         General: Normal range of motion.      Comments: No tenderness to palpation throughout shoulder, elbow wrist.  When fully flexing shoulder paresthesias are elicited.  5/5 strength in upper extremities.  Sensation intact.  2+ distal pulses.  Good  strength bilaterally.     Neurological: He is alert.   Skin: Skin is warm and dry. No rash noted.   Psychiatric: He has a normal mood  and affect. His behavior is normal. Judgment and thought content normal.         ED Course   Procedures  Labs Reviewed - No data to display       Imaging Results    None          Medications - No data to display  Medical Decision Making  39-year-old male presents secondary to numbness and pain to left upper extremity.  Pain is reproducible with left lateral rotation of neck.  Patient was previously seen in ED on 07/12 and diagnosed with cervical radiculopathy and discharged with lidocaine patches, Mobic and muscle relaxers.  He reports medication has not been helping.  He reports symptoms have been this same and have not increased or worsened.    Differential diagnosis includes but not limited to:  Compartment syndrome, nerve injury slept palsy, cervical radiculopathy, bursitis, muscle strain, ligament tear/strain, osteoarthritis and others.    Patient's physical exam is most consistent with cervical radiculopathy that he was previously diagnosed with.  He has reproducible left upper extremity pain with left lateral rotation of neck.  No tenderness to palpation to neck, shoulder joint, arm, elbow wrist.  With full flexion of shoulder paresthesias can be induced.  Spoke with the patient about follow up with Neurosurgery.  will send referral.  Will send home with naproxen and short course of gabapentin.  Stressed the importance of follow up, we discussed strict return precautions.  I discussed with the patient the diagnosis, treatment plan, indications return to the emergency department.  Patient verbalized and understanding.  The patient was asked if there any questions or concern.  We discussed the case until all issues or dressing the patient's satisfaction.  Patient is understanding and agreeable with treatment plan    Risk  Prescription drug management.            Scribe Attestation:   Scribe #1: I performed the above scribed service and the documentation accurately describes the services I performed. I attest to  the accuracy of the note.        ED Course as of 07/18/25 1122   Fri Jul 18, 2025   1010 BP: 135/88 [MB]   1010 Temp: 97.9 °F (36.6 °C) [MB]   1010 Pulse: 93 [MB]   1010 Resp: 18 [MB]   1010 SpO2: 96 % [MB]   1121 EKG 12-lead  EKG independently interpreted by me:   Normal sinus rhythm   Rate 89      Normal axis   ST elevation   No STEMI [MB]      ED Course User Index  [MB] Delores Brooks PA-C                               Clinical Impression:  Final diagnoses:  [M54.12] Cervical radiculopathy (Primary)  [R20.2] Paresthesias          ED Disposition Condition    Discharge Stable          ED Prescriptions       Medication Sig Dispense Start Date End Date Auth. Provider    naproxen (NAPROSYN) 500 MG tablet Take 1 tablet (500 mg total) by mouth 2 (two) times daily with meals. 60 tablet 7/18/2025 -- Delores Brooks PA-C    gabapentin (NEURONTIN) 100 MG capsule Take 1 capsule (100 mg total) by mouth 3 (three) times daily. 90 capsule 7/18/2025 8/17/2025 Delores Brooks PA-C          Follow-up Information       Follow up With Specialties Details Why Contact Info    Valentin Chavez MD Family Medicine Schedule an appointment as soon as possible for a visit in 1 week for follow up, As needed [address] [address]  [phone]      [facility] NEUROSURGERY Neurosurgery Schedule an appointment as soon as possible for a visit in 3 days for follow up [address]  [facility] [place] [address] [phone]    [facility] Emergency Dept Emergency Medicine Go to  If symptoms worsen, As needed, shortness of breath, chest pain, fever, worsening cough, nausea, vomiting, abdominal pain [address] [facility] [place] [address] [phone]            Delores MCCLOUD PA-C, personally performed the services described in this documentation. All medical record entries made by the scribe were at my  direction and in my presence. I have reviewed the chart and agree that the record reflects my personal performance and is accurate and complete.       Delores Brooks PA-C  07/18/25 1115         [1]   Social History  Tobacco Use    Smoking status: Never    Smokeless tobacco: Never   Vaping Use    Vaping status: Never Used   Substance Use Topics    Alcohol use: Yes     Comment: Social    Drug use: No        Delores Brooks PA-C  07/18/25 1128

## 2025-07-18 NOTE — DISCHARGE INSTRUCTIONS

## 2025-07-21 ENCOUNTER — OFFICE VISIT (OUTPATIENT)
Dept: NEUROSURGERY | Facility: CLINIC | Age: 40
End: 2025-07-21
Payer: COMMERCIAL

## 2025-07-21 VITALS
RESPIRATION RATE: 18 BRPM | BODY MASS INDEX: 37.1 KG/M2 | HEIGHT: 71 IN | SYSTOLIC BLOOD PRESSURE: 142 MMHG | HEART RATE: 118 BPM | DIASTOLIC BLOOD PRESSURE: 91 MMHG | WEIGHT: 265 LBS

## 2025-07-21 DIAGNOSIS — M54.12 CERVICAL RADICULOPATHY: ICD-10-CM

## 2025-07-21 RX ORDER — METHYLPREDNISOLONE ACETATE 40 MG/ML
40 INJECTION, SUSPENSION INTRA-ARTICULAR; INTRALESIONAL; INTRAMUSCULAR; SOFT TISSUE ONCE
Status: COMPLETED | OUTPATIENT
Start: 2025-07-21 | End: 2025-07-21

## 2025-07-21 RX ORDER — METHYLPREDNISOLONE 4 MG/1
TABLET ORAL
Qty: 21 EACH | Refills: 0 | Status: SHIPPED | OUTPATIENT
Start: 2025-07-21 | End: 2025-08-11

## 2025-07-21 RX ORDER — KETOROLAC TROMETHAMINE 30 MG/ML
30 INJECTION, SOLUTION INTRAMUSCULAR; INTRAVENOUS
Status: COMPLETED | OUTPATIENT
Start: 2025-07-21 | End: 2025-07-21

## 2025-07-21 RX ADMIN — KETOROLAC TROMETHAMINE 30 MG: 30 INJECTION, SOLUTION INTRAMUSCULAR; INTRAVENOUS at 11:07

## 2025-07-21 RX ADMIN — METHYLPREDNISOLONE ACETATE 40 MG: 40 INJECTION, SUSPENSION INTRA-ARTICULAR; INTRALESIONAL; INTRAMUSCULAR; SOFT TISSUE at 11:07
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
## 2025-07-30 ENCOUNTER — HOSPITAL ENCOUNTER (OUTPATIENT)
Dept: RADIOLOGY | Facility: HOSPITAL | Age: 40
Discharge: HOME | End: 2025-07-30
Payer: COMMERCIAL

## 2025-07-30 DIAGNOSIS — M54.12 CERVICAL RADICULOPATHY: ICD-10-CM

## 2025-07-30 PROCEDURE — 72125 CT NECK SPINE W/O DYE: CPT | Mod: TC

## 2025-07-30 PROCEDURE — 72110 X-RAY EXAM L-2 SPINE 4/>VWS: CPT | Mod: 26,,, | Performed by: RADIOLOGY

## 2025-07-30 PROCEDURE — 72141 MRI NECK SPINE W/O DYE: CPT | Mod: 26,,, | Performed by: RADIOLOGY

## 2025-07-30 PROCEDURE — 72110 X-RAY EXAM L-2 SPINE 4/>VWS: CPT | Mod: TC

## 2025-07-30 PROCEDURE — 72141 MRI NECK SPINE W/O DYE: CPT | Mod: TC

## 2025-07-31 ENCOUNTER — PATIENT OUTREACH (OUTPATIENT)
Facility: OTHER | Age: 40
End: 2025-07-31
Payer: COMMERCIAL

## 2025-08-05 ENCOUNTER — OFFICE VISIT (OUTPATIENT)
Dept: NEUROSURGERY | Facility: CLINIC | Age: 40
End: 2025-08-05
Payer: COMMERCIAL

## 2025-08-05 VITALS
BODY MASS INDEX: 37.1 KG/M2 | RESPIRATION RATE: 18 BRPM | WEIGHT: 265 LBS | HEIGHT: 71 IN | SYSTOLIC BLOOD PRESSURE: 129 MMHG | HEART RATE: 77 BPM | DIASTOLIC BLOOD PRESSURE: 83 MMHG

## 2025-08-05 DIAGNOSIS — M54.12 CERVICAL RADICULOPATHY: Primary | ICD-10-CM

## 2025-08-05 PROCEDURE — 99214 OFFICE O/P EST MOD 30 MIN: CPT | Mod: S$GLB,,, | Performed by: NEUROLOGICAL SURGERY

## 2025-08-05 PROCEDURE — 3074F SYST BP LT 130 MM HG: CPT | Mod: CPTII,S$GLB,, | Performed by: NEUROLOGICAL SURGERY

## 2025-08-05 PROCEDURE — 1159F MED LIST DOCD IN RCRD: CPT | Mod: CPTII,S$GLB,, | Performed by: NEUROLOGICAL SURGERY

## 2025-08-05 PROCEDURE — 3079F DIAST BP 80-89 MM HG: CPT | Mod: CPTII,S$GLB,, | Performed by: NEUROLOGICAL SURGERY

## 2025-08-05 PROCEDURE — 3008F BODY MASS INDEX DOCD: CPT | Mod: CPTII,S$GLB,, | Performed by: NEUROLOGICAL SURGERY
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
## (undated) DEVICE — NDL STRAIGHT 4CM LEIBINGER

## (undated) DEVICE — SEE MEDLINE ITEM 157128

## (undated) DEVICE — SUT VICRYL PLUS 3-0 SH 18IN

## (undated) DEVICE — CORD BIPOLAR 12 FOOT

## (undated) DEVICE — Device

## (undated) DEVICE — ELECTRODE REM PLYHSV RETURN 9

## (undated) DEVICE — SEE MEDLINE ITEM 152622

## (undated) DEVICE — GOWN SURGICAL X-LARGE

## (undated) DEVICE — DRAPE STERI INSTRUMENT 1018

## (undated) DEVICE — SHEET EENT SPLIT

## (undated) DEVICE — CANNULA ANTERIOR 20G

## (undated) DEVICE — SPONGE GELFOAM 12-7MM

## (undated) DEVICE — ELECTRODE BLADE INSULATED 1 IN

## (undated) DEVICE — SPONGE PATTY SURGICAL .5X3IN

## (undated) DEVICE — BLADE SURG #15 CARBON STEEL

## (undated) DEVICE — MARKER SKIN STND TIP BLUE BARR

## (undated) DEVICE — TRAY MINOR GEN SURG

## (undated) DEVICE — SUT 5/0 30IN SILK BLK BRAID

## (undated) DEVICE — PROTECTOR CORNEAL CROUCH ST

## (undated) DEVICE — SUT PROLENE 5/0 RB-1 36 IN

## (undated) DEVICE — SPLINT NASAL AIRWAY SEPTAL SIL

## (undated) DEVICE — SOL BETADINE 5%

## (undated) DEVICE — SUT 2/0 30IN SILK BLK BRAI

## (undated) DEVICE — DISCONTINUED SEE L# 4355

## (undated) DEVICE — APPLICATOR Q-TIPS BULK 3 INCH

## (undated) DEVICE — SUT WIRE 2 LS1 MP STEEL